# Patient Record
Sex: MALE | Race: WHITE | NOT HISPANIC OR LATINO | Employment: OTHER | URBAN - NONMETROPOLITAN AREA
[De-identification: names, ages, dates, MRNs, and addresses within clinical notes are randomized per-mention and may not be internally consistent; named-entity substitution may affect disease eponyms.]

---

## 2021-11-01 ENCOUNTER — APPOINTMENT (OUTPATIENT)
Dept: GENERAL RADIOLOGY | Facility: HOSPITAL | Age: 51
End: 2021-11-01

## 2021-11-01 ENCOUNTER — HOSPITAL ENCOUNTER (EMERGENCY)
Facility: HOSPITAL | Age: 51
Discharge: HOME | End: 2021-11-01
Attending: EMERGENCY MEDICINE | Admitting: EMERGENCY MEDICINE

## 2021-11-01 VITALS
DIASTOLIC BLOOD PRESSURE: 85 MMHG | WEIGHT: 290 LBS | RESPIRATION RATE: 16 BRPM | TEMPERATURE: 98.6 F | HEIGHT: 69 IN | BODY MASS INDEX: 42.95 KG/M2 | HEART RATE: 80 BPM | OXYGEN SATURATION: 98 % | SYSTOLIC BLOOD PRESSURE: 129 MMHG

## 2021-11-01 DIAGNOSIS — R07.9 CHEST PAIN, UNSPECIFIED TYPE: Primary | ICD-10-CM

## 2021-11-01 LAB
ALBUMIN SERPL-MCNC: 4.4 G/DL (ref 3.5–5.2)
ALBUMIN/GLOB SERPL: 1.9 G/DL
ALP SERPL-CCNC: 74 U/L (ref 39–117)
ALT SERPL W P-5'-P-CCNC: 28 U/L (ref 1–41)
ANION GAP SERPL CALCULATED.3IONS-SCNC: 10 MMOL/L (ref 5–15)
AST SERPL-CCNC: 20 U/L (ref 1–40)
BASOPHILS # BLD AUTO: 0.08 10*3/MM3 (ref 0–0.2)
BASOPHILS NFR BLD AUTO: 0.9 % (ref 0–1.5)
BILIRUB SERPL-MCNC: 0.6 MG/DL (ref 0–1.2)
BUN SERPL-MCNC: 11 MG/DL (ref 6–20)
BUN/CREAT SERPL: 12.4 (ref 7–25)
CALCIUM SPEC-SCNC: 9.2 MG/DL (ref 8.6–10.5)
CHLORIDE SERPL-SCNC: 103 MMOL/L (ref 98–107)
CO2 SERPL-SCNC: 26 MMOL/L (ref 22–29)
CREAT SERPL-MCNC: 0.89 MG/DL (ref 0.76–1.27)
DEPRECATED RDW RBC AUTO: 38.2 FL (ref 37–54)
EOSINOPHIL # BLD AUTO: 0.32 10*3/MM3 (ref 0–0.4)
EOSINOPHIL NFR BLD AUTO: 3.5 % (ref 0.3–6.2)
ERYTHROCYTE [DISTWIDTH] IN BLOOD BY AUTOMATED COUNT: 12.2 % (ref 12.3–15.4)
GFR SERPL CREATININE-BSD FRML MDRD: 91 ML/MIN/1.73
GLOBULIN UR ELPH-MCNC: 2.3 GM/DL
GLUCOSE SERPL-MCNC: 93 MG/DL (ref 65–99)
HCT VFR BLD AUTO: 45.1 % (ref 37.5–51)
HGB BLD-MCNC: 16 G/DL (ref 13–17.7)
HOLD SPECIMEN: NORMAL
HOLD SPECIMEN: NORMAL
IMM GRANULOCYTES # BLD AUTO: 0.07 10*3/MM3 (ref 0–0.05)
IMM GRANULOCYTES NFR BLD AUTO: 0.8 % (ref 0–0.5)
INR PPP: 0.98 (ref 0.8–1.2)
LYMPHOCYTES # BLD AUTO: 2.75 10*3/MM3 (ref 0.7–3.1)
LYMPHOCYTES NFR BLD AUTO: 30.1 % (ref 19.6–45.3)
MCH RBC QN AUTO: 30.7 PG (ref 26.6–33)
MCHC RBC AUTO-ENTMCNC: 35.5 G/DL (ref 31.5–35.7)
MCV RBC AUTO: 86.4 FL (ref 79–97)
MONOCYTES # BLD AUTO: 0.78 10*3/MM3 (ref 0.1–0.9)
MONOCYTES NFR BLD AUTO: 8.5 % (ref 5–12)
NEUTROPHILS NFR BLD AUTO: 5.15 10*3/MM3 (ref 1.7–7)
NEUTROPHILS NFR BLD AUTO: 56.2 % (ref 42.7–76)
NRBC BLD AUTO-RTO: 0 /100 WBC (ref 0–0.2)
NT-PROBNP SERPL-MCNC: 10.8 PG/ML (ref 0–450)
PLATELET # BLD AUTO: 202 10*3/MM3 (ref 140–450)
PMV BLD AUTO: 9.7 FL (ref 6–12)
POTASSIUM SERPL-SCNC: 4.1 MMOL/L (ref 3.5–5.2)
PROT SERPL-MCNC: 6.7 G/DL (ref 6–8.5)
PROTHROMBIN TIME: 12.9 SECONDS (ref 11.1–15.3)
QT INTERVAL: 338 MS
QTC INTERVAL: 431 MS
RBC # BLD AUTO: 5.22 10*6/MM3 (ref 4.14–5.8)
SODIUM SERPL-SCNC: 139 MMOL/L (ref 136–145)
TROPONIN T SERPL-MCNC: <0.01 NG/ML (ref 0–0.03)
TROPONIN T SERPL-MCNC: <0.01 NG/ML (ref 0–0.03)
WBC # BLD AUTO: 9.15 10*3/MM3 (ref 3.4–10.8)
WHOLE BLOOD HOLD SPECIMEN: NORMAL
WHOLE BLOOD HOLD SPECIMEN: NORMAL

## 2021-11-01 PROCEDURE — 83880 ASSAY OF NATRIURETIC PEPTIDE: CPT | Performed by: EMERGENCY MEDICINE

## 2021-11-01 PROCEDURE — 93010 ELECTROCARDIOGRAM REPORT: CPT | Performed by: INTERNAL MEDICINE

## 2021-11-01 PROCEDURE — 85610 PROTHROMBIN TIME: CPT | Performed by: EMERGENCY MEDICINE

## 2021-11-01 PROCEDURE — 80053 COMPREHEN METABOLIC PANEL: CPT | Performed by: EMERGENCY MEDICINE

## 2021-11-01 PROCEDURE — 84484 ASSAY OF TROPONIN QUANT: CPT | Performed by: EMERGENCY MEDICINE

## 2021-11-01 PROCEDURE — 85025 COMPLETE CBC W/AUTO DIFF WBC: CPT | Performed by: EMERGENCY MEDICINE

## 2021-11-01 PROCEDURE — 99283 EMERGENCY DEPT VISIT LOW MDM: CPT

## 2021-11-01 PROCEDURE — 93005 ELECTROCARDIOGRAM TRACING: CPT | Performed by: EMERGENCY MEDICINE

## 2021-11-01 PROCEDURE — 71045 X-RAY EXAM CHEST 1 VIEW: CPT

## 2021-11-01 RX ORDER — SODIUM CHLORIDE 0.9 % (FLUSH) 0.9 %
10 SYRINGE (ML) INJECTION AS NEEDED
Status: DISCONTINUED | OUTPATIENT
Start: 2021-11-01 | End: 2021-11-02 | Stop reason: HOSPADM

## 2021-11-01 RX ORDER — METOPROLOL SUCCINATE 50 MG/1
50 TABLET, EXTENDED RELEASE ORAL DAILY
COMMUNITY
End: 2021-11-08 | Stop reason: SDUPTHER

## 2021-11-02 NOTE — ED PROVIDER NOTES
Subjective   50-year-old male with history of thyroid issue who takes metoprolol for heart rate control presents the emergency department with concern for chest pain.  He reports he has had some intermittent issues with chest pain over the last few months that seem to be worse with bending over.  He reports that it is right-sided and mostly sharp pain rarely radiates to the left.  Tonight it was sharp and at times when the sharp pain is gone does feel like some pressure.  Denies associated shortness of breath, nausea or diaphoresis.  No lower extremity swelling.  Reports tonight he was helping his father drag a deer when the pain started.  It did not seem to get better with rest as it usually does.  He has never had a stress test or heart catheterization.  He does have a brother who had bypass surgery this year at the age of 49.  Father also has coronary artery disease.  He is a former smoker.    Family history, surgical history, social history, current medications and allergies are reviewed with the patient and triage documentation and vitals are reviewed.      History provided by:  Patient   used: No        Review of Systems   Constitutional: Negative for chills and fever.   HENT: Negative for congestion and sore throat.    Eyes: Negative for photophobia and visual disturbance.   Respiratory: Negative for shortness of breath and wheezing.    Cardiovascular: Positive for chest pain. Negative for palpitations and leg swelling.   Gastrointestinal: Negative for abdominal pain, diarrhea, nausea and vomiting.   Endocrine: Negative for polydipsia, polyphagia and polyuria.   Genitourinary: Negative for dysuria, frequency, hematuria and urgency.   Musculoskeletal: Negative for arthralgias, back pain, myalgias and neck pain.   Skin: Negative for rash and wound.   Allergic/Immunologic: Negative.    Neurological: Negative.    Hematological: Negative.    Psychiatric/Behavioral: Negative.        History  reviewed. No pertinent past medical history.    Allergies   Allergen Reactions   • Hydrocodone-Acetaminophen Anxiety     Reaction: anxiety       Past Surgical History:   Procedure Laterality Date   • AMPUTATION FINGER / THUMB     • APPENDECTOMY         History reviewed. No pertinent family history.    Social History     Socioeconomic History   • Marital status:    Tobacco Use   • Smoking status: Former Smoker   • Smokeless tobacco: Never Used   • Tobacco comment: ages [age]   Substance and Sexual Activity   • Alcohol use: Never   • Drug use: Never   • Sexual activity: Defer           Objective   Physical Exam  Vitals and nursing note reviewed.   Constitutional:       General: He is not in acute distress.     Appearance: He is well-developed. He is obese. He is not ill-appearing, toxic-appearing or diaphoretic.   HENT:      Head: Normocephalic.   Eyes:      Pupils: Pupils are equal, round, and reactive to light.   Neck:      Vascular: No JVD.   Cardiovascular:      Rate and Rhythm: Normal rate and regular rhythm.  No extrasystoles are present.     Pulses:           Radial pulses are 2+ on the right side and 2+ on the left side.        Dorsalis pedis pulses are 2+ on the right side and 2+ on the left side.      Heart sounds: No murmur heard.      Pulmonary:      Effort: Pulmonary effort is normal. No tachypnea, accessory muscle usage or respiratory distress.      Breath sounds: No decreased breath sounds, wheezing, rhonchi or rales.   Chest:      Chest wall: No tenderness.   Abdominal:      General: Bowel sounds are normal.      Palpations: Abdomen is soft. There is no hepatomegaly or splenomegaly.   Musculoskeletal:      Cervical back: Normal range of motion and neck supple.      Right lower leg: No edema.      Left lower leg: No edema.   Skin:     General: Skin is warm and dry.      Capillary Refill: Capillary refill takes less than 2 seconds.   Neurological:      General: No focal deficit present.       Mental Status: He is alert and oriented to person, place, and time.   Psychiatric:         Mood and Affect: Mood normal.         Behavior: Behavior normal.         Procedures  none         ED Course      Labs Reviewed   CBC WITH AUTO DIFFERENTIAL - Abnormal; Notable for the following components:       Result Value    RDW 12.2 (*)     Immature Grans % 0.8 (*)     Immature Grans, Absolute 0.07 (*)     All other components within normal limits   TROPONIN (IN-HOUSE) - Normal    Narrative:     Troponin T Reference Range:  <= 0.03 ng/mL-   Negative for AMI  >0.03 ng/mL-     Abnormal for myocardial necrosis.  Clinicians would have to utilize clinical acumen, EKG, Troponin and serial changes to determine if it is an Acute Myocardial Infarction or myocardial injury due to an underlying chronic condition.       Results may be falsely decreased if patient taking Biotin.     TROPONIN (IN-HOUSE) - Normal    Narrative:     Troponin T Reference Range:  <= 0.03 ng/mL-   Negative for AMI  >0.03 ng/mL-     Abnormal for myocardial necrosis.  Clinicians would have to utilize clinical acumen, EKG, Troponin and serial changes to determine if it is an Acute Myocardial Infarction or myocardial injury due to an underlying chronic condition.       Results may be falsely decreased if patient taking Biotin.     BNP (IN-HOUSE) - Normal    Narrative:     Among patients with dyspnea, NT-proBNP is highly sensitive for the detection of acute congestive heart failure. In addition NT-proBNP of <300 pg/ml effectively rules out acute congestive heart failure with 99% negative predictive value.    Results may be falsely decreased if patient taking Biotin.     PROTIME-INR - Normal    Narrative:     Therapeutic range for most indications is 2.0-3.0 INR,  or 2.5-3.5 for mechanical heart valves.   RAINBOW DRAW    Narrative:     The following orders were created for panel order [place] Draw.  Procedure                               Abnormality         Status                      ---------                               -----------         ------                     Green Top (Gel)[[id number]]                                  Final result               Lavender Top[[id number]]                                     Final result               Gold Top - SST[[id number]]                                   Final result               Light Blue Top[[id number]]                                   Final result                 Please view results for these tests on the individual orders.   COMPREHENSIVE METABOLIC PANEL    Narrative:     GFR Normal >60  Chronic Kidney Disease <60  Kidney Failure <15     CBC AND DIFFERENTIAL    Narrative:     The following orders were created for panel order CBC & Differential.  Procedure                               Abnormality         Status                     ---------                               -----------         ------                     CBC Auto Differential[[id number]]        Abnormal            Final result                 Please view results for these tests on the individual orders.   GREEN TOP   LAVENDER TOP   GOLD TOP - SST   LIGHT BLUE TOP     XR Chest 1 View    Result Date: 11/1/2021  Narrative: EXAM:   XR Chest, 1 View CLINICAL HISTORY:   The patient is 50 years old and is Male; Chest Pain triage protocol Chest Pain Triage Protocol TECHNIQUE:   Frontal view of the chest. COMPARISON:   3/1/2014 FINDINGS:   LUNGS:  Bilateral atelectasis.  No consolidation.   PLEURAL SPACE:  No pneumothorax or pleural effusion.   HEART:  Normal   MEDIASTINUM:  Normal   BONES/JOINTS:  Normal     Impression: Bilateral atelectasis. Electronically signed by:  Efrain uGan MD  11/1/2021 9:23 PM CDT Workstation: [id number]      EKG November 1, 2021 at 2012 reveals normal sinus rhythm at a rate of 98 bpm.  Incomplete right bundle branch block and left anterior fascicular block.  QTc 431.  T wave flattening in aVL and lead III without inversion.  No ST elevation or  depression.  No evidence of acute ischemia.      HEART Score (for prediction of 6-week risk of major adverse cardiac event) reviewed and/or performed as part of the patient evaluation and treatment planning process.  The result associated with this review/performance is: 3     HEART Score for Major Cardiac Events from [email or web address]  on 11/1/2021  ** All calculations should be rechecked by clinician prior to use **    RESULT SUMMARY:  3 points  Low Score (0-3 points)    Risk of MACE of 0.9-1.7%.      INPUTS:  History --> 1 = Moderately suspicious  EKG --> 0 = Normal  Age --> 1 = 45-64  Risk factors --> 1 = 1-2 risk factors  Initial troponin --> 0 = ?normal limit      MDM  Number of Diagnoses or Management Options     Amount and/or Complexity of Data Reviewed  Clinical lab tests: reviewed  Tests in the radiology section of CPT®: reviewed    Patient Progress  Patient progress: stable    Heart score of 3.  Low risk factors but slightly concerning story.  Troponin negative x2.  Vital signs remained stable.  Patient is advised on need for close outpatient stress testing and is given information on contacting cardiology for this.  Not felt necessary to admit for further inpatient evaluation at this time.  He is advised on reasons to return to the emergency department for further evaluation and agreeable to plan and discharge.    Final diagnoses:   Chest pain, unspecified type       ED Disposition  ED Disposition     ED Disposition Condition Comment    Discharge Stable           Neelima Garner, [address]  [facility] [address] [phone]    Schedule an appointment as soon as possible for a visit   for outpatient stress test    Alcon Tubbs MD  [address] [place] [id number] [phone]               Medication List      No changes were made to your prescriptions during this visit.          Juan José Toledo, DO  11/02/21 0000

## 2021-11-02 NOTE — DISCHARGE INSTRUCTIONS
Please return for new or worsening symptoms.  Follow-up with cardiology provided for outpatient stress testing.

## 2021-11-08 ENCOUNTER — OFFICE VISIT (OUTPATIENT)
Dept: CARDIOLOGY | Facility: CLINIC | Age: 51
End: 2021-11-08

## 2021-11-08 VITALS
HEART RATE: 94 BPM | HEIGHT: 69 IN | OXYGEN SATURATION: 98 % | WEIGHT: 307.4 LBS | BODY MASS INDEX: 45.53 KG/M2 | SYSTOLIC BLOOD PRESSURE: 128 MMHG | DIASTOLIC BLOOD PRESSURE: 86 MMHG

## 2021-11-08 DIAGNOSIS — R07.2 PRECORDIAL CHEST PAIN: Primary | ICD-10-CM

## 2021-11-08 PROCEDURE — 99204 OFFICE O/P NEW MOD 45 MIN: CPT | Performed by: NURSE PRACTITIONER

## 2021-11-08 RX ORDER — ATORVASTATIN CALCIUM 10 MG/1
10 TABLET, FILM COATED ORAL DAILY
Qty: 30 TABLET | Refills: 11 | Status: SHIPPED | OUTPATIENT
Start: 2021-11-08 | End: 2022-12-09 | Stop reason: DRUGHIGH

## 2021-11-08 RX ORDER — METOPROLOL SUCCINATE 50 MG/1
50 TABLET, EXTENDED RELEASE ORAL DAILY
Qty: 30 TABLET | Refills: 3 | Status: SHIPPED | OUTPATIENT
Start: 2021-11-08 | End: 2021-12-07

## 2021-11-08 RX ORDER — ASPIRIN 81 MG/1
81 TABLET ORAL DAILY
Qty: 30 TABLET | Refills: 6 | Status: SHIPPED | OUTPATIENT
Start: 2021-11-08 | End: 2023-03-15 | Stop reason: SDUPTHER

## 2021-11-08 NOTE — PROGRESS NOTES
[facility] [place]    Chest Pain (chief complaint)      History of Present Illness    Mr. Bishop is a 50 year old patient who presents to [facility] as an ER follow up for chest pain.   He has chest pain chronically for 3 months or more. He has this pain almost daily. IT is reproducible on his right side when he lies down, bends over, or picks something up.   This midsternal pain is sharp and intermittent. This pain is usually moderate and controlled.   His brother had open heart surgery last year at 49 and is not a DM or smoker. His dad was in his 60s with MI and CABG in [place].      He has gained weight. He is not on medication. The BB was ordered a while ago because his HR was high due to hyperthyroidism.     Today I added Lipitor, ASA, and Toprol XL for his angina. CCTA was ordered. Echo from 2014 reviewed, without structural changes.     Cardiac Risk Factors:  The ASCVD Risk score (Dora GEM Jr., et al., 2013) failed to calculate for the following reasons:    Cannot find a previous HDL lab    Cannot find a previous total cholesterol lab      Past Medical History:   Diagnosis Date   • Thyroid trouble      Past Surgical History:   Procedure Laterality Date   • AMPUTATION     • APPENDECTOMY       Social History     Socioeconomic History   • Marital status:    Tobacco Use   • Smoking status: Former Smoker     Types: Cigarettes     Quit date: 3/1/2007     Years since quittin.7   • Smokeless tobacco: Never Used   Substance and Sexual Activity   • Alcohol use: Never   • Drug use: Never   • Sexual activity: Defer     Family History   Problem Relation Age of Onset   • Kidney disease Mother    • Heart disease Father    • Heart attack Father    • Heart disease Brother        ALLERGIES:  Allergies   Allergen Reactions   • Hydrocodone-Acetaminophen Anxiety     Reaction: anxiety       Advance Care  Planning   ACP discussion was declined by the patient. Patient does not have an advance directive, declines further assistance.       Review of Systems   Constitutional: Negative for chills, decreased appetite and fever.   HENT: Negative.    Eyes: Negative.    Cardiovascular: Negative for chest pain, claudication, dyspnea on exertion, irregular heartbeat, leg swelling and palpitations.   Respiratory: Negative for cough, shortness of breath and wheezing.    Endocrine: Negative.    Skin: Negative for dry skin, flushing and rash.   Musculoskeletal: Negative for falls and myalgias.   Gastrointestinal: Negative for abdominal pain, change in bowel habit and melena.   Genitourinary: Negative for frequency and hematuria.   Neurological: Negative for dizziness, light-headedness, loss of balance and weakness.   Psychiatric/Behavioral: Negative for altered mental status and memory loss. The patient is not nervous/anxious.        Current Outpatient Medications   Medication Sig Dispense Refill   • metoprolol succinate XL (TOPROL-XL) 50 MG 24 hr tablet Take 1 tablet by mouth Daily. 30 tablet 3   • aspirin (aspirin) 81 MG EC tablet Take 1 tablet by mouth Daily. 30 tablet 6   • atorvastatin (LIPITOR) 10 MG tablet Take 1 tablet by mouth Daily. 30 tablet 11     No current facility-administered medications for this visit.       OBJECTIVE:    Physical Exam:   Constitutional:       General: Not in acute distress.     Appearance: Well-developed.   HENT:      Head: Normocephalic and atraumatic.   Neck:      Vascular: No JVD.   Pulmonary:      Effort: Pulmonary effort is normal. No respiratory distress.      Breath sounds: Normal breath sounds. No wheezing. No rales.   Cardiovascular:      Normal rate. Regular rhythm.   Pulses:     Intact distal pulses.   Abdominal:      General: Bowel sounds are normal.      Palpations: Abdomen is soft.   Musculoskeletal: Normal range of motion.      Cervical back: Normal range of motion. Skin:      "General: Skin is warm and dry.      Findings: No erythema.   Neurological:      Mental Status: Alert and oriented to person, place, and time.   Psychiatric:         Behavior: Behavior normal.         Thought Content: Thought content normal.         Judgment: Judgment normal.       Vitals:    21 1015   BP: 128/86   BP Location: Left arm   Patient Position: Sitting   Cuff Size: Adult   Pulse: 94   SpO2: 98%   Weight: (!) 139 kg (307 lb 6.4 oz)   Height: 175.3 cm (69\")       DATA REVIEWED by me:   Results for orders placed in visit on 14    Echo - Converted    Narrative  [facility]      PT NAME:  [facility]  MR NUMBER:  [id number]  :  1970  ECHOCARDIOGRAM      MEDICAL OFFICE BUILDING  DATE:  2014    PRIMARY CARE PHYSICIAN:    ORDERING DOCTOR:  IFEOMA DEJESUS MD    REASON FOR ECHO:  Dyspnea  INTERPRETING  CARDIOLOGIST:  KAIA GARCIA M.D.    PRIOR ECHO:  No            DATE OF PRIOR ECHO:    PATIENT PROCEDURE #:       LOCATION OF STUDY:  [address]      QUANTITATIVE MEASUREMENTS    LA =    38     (<40)    LA/AO Ratio  1.2  Ao =    31     (<37)    AoV Open     24   (>15)  IVS=    10     (<11)    LVPW         10   (<11)  LVEDd=  52     (38-56)  LVEDs        35   (22-40)  RV=            (<26)    LVOT         2.4  EF=     55-60  %        %F.S.             (24-46)    Max. Pulmonary Gradient  6    mmHg  RV Systolic Pressure          mmHg    MV Area                  5.5  cm2  Mean MV Gradient         1    mmHg  MV p 1/2 t               40   msec    EVELYN (Doppler)            3.2  cm2  EVELYN (Planimeter)              cm2  Max. AV Gradient         5    mmHg  Mean AV Gradient         3    mmHg  AI p 1/2 t                    msec    DESCRIPTION:  Quality of study is fair.    FINDINGS  CHAMBER DIMENSIONS AND FUNCTION  1. Left ventricular systolic function is normal with ejection fraction  of 55% to 60%.  Left ventricular wall thickness " is normal.  2. Left atrium is normal in size.  3. Right ventricular contractility and chamber dimensions are normal.  4. Right atrium is normal in size.      VALVES  1. Mitral valve leaflets are normal with intact valve excursion.  2. Aortic valve is trileaflet in structure with normal valve motion.  3. Pulmonic valve was not well visualized.    Color flow Doppler imaging reveals no evidence of valve regurgitation.  The spectral Doppler velocity measurements of the mitral valve inflow  reveals normal E:A wave amplitude ratio.    Pericardium is normal with no evidence of pericardial effusion.    IVC was not well visualized.    A:  1. Normal left ventricular systolic function.  Ejection fraction of 55%  to 60%.  2. No evidence of pericardial effusion.      KAIA GARCIA M.D.  Electronically Signed  03/27/2014 10:16:54  By KAIA GARCIA M.D.    SANAM/joseph  Dictated:   03/22/2014 [id number]  Transcribed:   03/24/2014 1229  Page #page      XR Chest 1 View    Result Date: 11/1/2021  Bilateral atelectasis. Electronically signed by:  Efrain Guan MD  11/1/2021 9:23 PM CDT Workstation: [id number]         Labs Reviewed by me: BMP, CBC, LIPID, TSH  Lab Results   Component Value Date    GLUCOSE 93 11/01/2021    CALCIUM 9.2 11/01/2021     11/01/2021    K 4.1 11/01/2021    CO2 26.0 11/01/2021     11/01/2021    BUN 11 11/01/2021    CREATININE 0.89 11/01/2021    EGFRIFNONA 91 11/01/2021    BCR 12.4 11/01/2021    ANIONGAP 10.0 11/01/2021     Lab Results   Component Value Date    WBC 9.15 11/01/2021    HGB 16.0 11/01/2021    HCT 45.1 11/01/2021    MCV 86.4 11/01/2021     11/01/2021     No results found for: CHOL  No results found for: TRIG  No results found for: HDL  No components found for: LDLCALC  No results found for: LDL  No results found for: HDLLDLRATIO  No components found for: CHOLHDL  Lab Results   Component Value Date    TSH 0.321 (L) 08/13/2019     Lab Results   Component Value Date    PROBNP 10.8 11/01/2021        EKG:     Echo 2014                           QUANTITATIVE MEASUREMENTS     LA =    38     (<40)    LA/AO Ratio  1.2  Ao =    31     (<37)    AoV Open     24   (>15)  IVS=    10     (<11)    LVPW         10   (<11)  LVEDd=  52     (38-56)  LVEDs        35   (22-40)  RV=            (<26)    LVOT         2.4  EF=     55-60  %        %F.S.             (24-46)     Max. Pulmonary Gradient  6    mmHg  RV Systolic Pressure          mmHg     MV Area                  5.5  cm2  Mean MV Gradient         1    mmHg  MV p 1/2 t               40   msec     EVELYN (Doppler)            3.2  cm2  EVELYN (Planimeter)              cm2  Max. AV Gradient         5    mmHg  Mean AV Gradient         3    mmHg  AI p 1/2 t                    msec     DESCRIPTION:  Quality of study is fair.     FINDINGS  CHAMBER DIMENSIONS AND FUNCTION  1. Left ventricular systolic function is normal with ejection fraction        of 55% to 60%.  Left ventricular wall thickness is normal.  2. Left atrium is normal in size.  3. Right ventricular contractility and chamber dimensions are normal.  4. Right atrium is normal in size.        VALVES  1. Mitral valve leaflets are normal with intact valve excursion.  2. Aortic valve is trileaflet in structure with normal valve motion.  3. Pulmonic valve was not well visualized.     Color flow Doppler imaging reveals no evidence of valve regurgitation.  The spectral Doppler velocity measurements of the mitral valve inflow  reveals normal E:A wave amplitude ratio.     Pericardium is normal with no evidence of pericardial effusion.     IVC was not well visualized.     A:  1. Normal left ventricular systolic function.  Ejection fraction of 55%        to 60%.  2. No evidence of pericardial effusion.        KAIA GARCIA M.D.      The following portions of the patient's history were reviewed and updated as appropriate: allergies, current medications, past family history, past medical history, past social history, past surgical  history and problem list.  Old records that were reviewed and pertinent information is included in the above objective data.     ASSESSMENT/PLAN:       Diagnosis Plan   1. Precordial chest pain  Chest pain syndrome. Pain characteristic: atypical angina   Patient is Moderate Risk.     Ischemic evaluation:ordered.    The patient is notable to exercise.   EKG is Abnormal:  Troponin is normal x 2.  Risks/Benefits discussed  Ischemia evaluation with Coronary CTA  TTE, Basic Labs, PA/LA CXR (results reviewed if completed)    CT Angiogram Coronary    Due to his strong CAD family history that is atypical for DM/smoking, I recommend a CTA coronary for anatomy.   We discussed the possible outcomes including low risk, 0, moderate and high risk >70%.   He is agreeable to a [facility] should the need arise.   He is agreeable to started chest pain medications including taking his Toprol as prescribed, including tonight and tomorrow for his CTA coronary.          Patient's Body mass index is 45.4 kg/m². indicating that he is obese (BMI >30). Obesity-related health conditions include the following: none. Obesity is unchanged. BMI is is above average; BMI management plan is completed. We discussed portion control and increasing exercise..    Follow up in 2 weeks to 1 month depending on outcome.           This document has been electronically signed by CHENG Mcgovern on November 8, 2021 13:31 CST

## 2021-11-08 NOTE — PATIENT INSTRUCTIONS
Try pepcid 20mg once or twice daily for about a week or 2 to see if it helps chest pains.     Will preform a CT scan of your heart arteries to evaluate for disease.     >70% blockage with prompt a heart cath.     Moderate disease will be medical management with ASA and cholesterol medication + your Metoprolol.

## 2021-11-09 ENCOUNTER — HOSPITAL ENCOUNTER (OUTPATIENT)
Dept: CT IMAGING | Facility: HOSPITAL | Age: 51
Discharge: HOME | End: 2021-11-09
Admitting: NURSE PRACTITIONER

## 2021-11-09 ENCOUNTER — PREP FOR SURGERY (OUTPATIENT)
Dept: OTHER | Facility: HOSPITAL | Age: 51
End: 2021-11-09

## 2021-11-09 DIAGNOSIS — R07.2 PRECORDIAL CHEST PAIN: Primary | ICD-10-CM

## 2021-11-09 DIAGNOSIS — R93.1 ABNORMAL FINDINGS DIAGNOSTIC IMAGING OF HEART AND CORONARY CIRCULATION: ICD-10-CM

## 2021-11-09 PROCEDURE — 75574 CT ANGIO HRT W/3D IMAGE: CPT

## 2021-11-09 PROCEDURE — 0 IOPAMIDOL PER 1 ML: Performed by: NURSE PRACTITIONER

## 2021-11-09 RX ORDER — ASPIRIN 325 MG
325 TABLET ORAL ONCE
Status: CANCELLED | OUTPATIENT
Start: 2021-11-09 | End: 2021-11-09

## 2021-11-09 RX ORDER — SODIUM CHLORIDE 0.9 % (FLUSH) 0.9 %
10 SYRINGE (ML) INJECTION AS NEEDED
Status: CANCELLED | OUTPATIENT
Start: 2021-11-16

## 2021-11-09 RX ORDER — ISOSORBIDE MONONITRATE 30 MG/1
30 TABLET, EXTENDED RELEASE ORAL EVERY MORNING
Qty: 30 TABLET | Refills: 3 | Status: SHIPPED | OUTPATIENT
Start: 2021-11-09 | End: 2021-11-17 | Stop reason: HOSPADM

## 2021-11-09 RX ORDER — SODIUM CHLORIDE 0.9 % (FLUSH) 0.9 %
3 SYRINGE (ML) INJECTION EVERY 12 HOURS SCHEDULED
Status: CANCELLED | OUTPATIENT
Start: 2021-11-16

## 2021-11-09 RX ORDER — SODIUM CHLORIDE 9 MG/ML
50 INJECTION, SOLUTION INTRAVENOUS CONTINUOUS
Status: CANCELLED | OUTPATIENT
Start: 2021-11-16

## 2021-11-09 RX ORDER — ASPIRIN 325 MG
325 TABLET, DELAYED RELEASE (ENTERIC COATED) ORAL DAILY
Status: CANCELLED | OUTPATIENT
Start: 2021-11-10

## 2021-11-09 RX ADMIN — IOPAMIDOL 90 ML: 755 INJECTION, SOLUTION INTRAVENOUS at 08:28

## 2021-11-11 ENCOUNTER — TELEPHONE (OUTPATIENT)
Dept: CARDIOLOGY | Facility: CLINIC | Age: 51
End: 2021-11-11

## 2021-11-15 ENCOUNTER — LAB (OUTPATIENT)
Dept: LAB | Facility: HOSPITAL | Age: 51
End: 2021-11-15

## 2021-11-15 DIAGNOSIS — R93.1 ABNORMAL FINDINGS DIAGNOSTIC IMAGING OF HEART AND CORONARY CIRCULATION: ICD-10-CM

## 2021-11-15 DIAGNOSIS — R07.2 PRECORDIAL CHEST PAIN: ICD-10-CM

## 2021-11-15 LAB — SARS-COV-2 N GENE RESP QL NAA+PROBE: NOT DETECTED

## 2021-11-15 PROCEDURE — C9803 HOPD COVID-19 SPEC COLLECT: HCPCS

## 2021-11-15 PROCEDURE — 87635 SARS-COV-2 COVID-19 AMP PRB: CPT

## 2021-11-16 ENCOUNTER — HOSPITAL ENCOUNTER (OUTPATIENT)
Facility: HOSPITAL | Age: 51
Discharge: HOME | End: 2021-11-17
Attending: INTERNAL MEDICINE | Admitting: INTERNAL MEDICINE

## 2021-11-16 ENCOUNTER — APPOINTMENT (OUTPATIENT)
Dept: CARDIOLOGY | Facility: HOSPITAL | Age: 51
End: 2021-11-16

## 2021-11-16 DIAGNOSIS — R07.2 PRECORDIAL CHEST PAIN: ICD-10-CM

## 2021-11-16 DIAGNOSIS — R93.1 ABNORMAL FINDINGS DIAGNOSTIC IMAGING OF HEART AND CORONARY CIRCULATION: ICD-10-CM

## 2021-11-16 LAB
ANION GAP SERPL CALCULATED.3IONS-SCNC: 7 MMOL/L (ref 5–15)
BH CV ECHO MEAS - ACS: 2.3 CM
BH CV ECHO MEAS - AO MAX PG (FULL): 0.59 MMHG
BH CV ECHO MEAS - AO MAX PG: 6.4 MMHG
BH CV ECHO MEAS - AO MEAN PG (FULL): -1 MMHG
BH CV ECHO MEAS - AO MEAN PG: 3 MMHG
BH CV ECHO MEAS - AO ROOT AREA (BSA CORRECTED): 1.4
BH CV ECHO MEAS - AO ROOT AREA: 9.6 CM^2
BH CV ECHO MEAS - AO ROOT DIAM: 3.5 CM
BH CV ECHO MEAS - AO V2 MAX: 126 CM/SEC
BH CV ECHO MEAS - AO V2 MEAN: 81.1 CM/SEC
BH CV ECHO MEAS - AO V2 VTI: 23.6 CM
BH CV ECHO MEAS - ASC AORTA: 3.3 CM
BH CV ECHO MEAS - AVA(I,A): 3.8 CM^2
BH CV ECHO MEAS - AVA(I,D): 3.8 CM^2
BH CV ECHO MEAS - AVA(V,A): 3.6 CM^2
BH CV ECHO MEAS - AVA(V,D): 3.6 CM^2
BH CV ECHO MEAS - BSA(HAYCOCK): 2.6 M^2
BH CV ECHO MEAS - BSA: 2.5 M^2
BH CV ECHO MEAS - BZI_BMI: 44.5 KILOGRAMS/M^2
BH CV ECHO MEAS - BZI_METRIC_HEIGHT: 175.3 CM
BH CV ECHO MEAS - BZI_METRIC_WEIGHT: 136.5 KG
BH CV ECHO MEAS - EDV(CUBED): 146.4 ML
BH CV ECHO MEAS - EDV(MOD-SP2): 102 ML
BH CV ECHO MEAS - EDV(MOD-SP4): 116 ML
BH CV ECHO MEAS - EDV(TEICH): 133.6 ML
BH CV ECHO MEAS - EF(CUBED): 70.7 %
BH CV ECHO MEAS - EF(MOD-SP2): 56.8 %
BH CV ECHO MEAS - EF(MOD-SP4): 56.7 %
BH CV ECHO MEAS - EF(TEICH): 61.9 %
BH CV ECHO MEAS - ESV(CUBED): 42.9 ML
BH CV ECHO MEAS - ESV(MOD-SP2): 44.1 ML
BH CV ECHO MEAS - ESV(MOD-SP4): 50.2 ML
BH CV ECHO MEAS - ESV(TEICH): 50.9 ML
BH CV ECHO MEAS - FS: 33.6 %
BH CV ECHO MEAS - IVS/LVPW: 1
BH CV ECHO MEAS - IVSD: 1.1 CM
BH CV ECHO MEAS - LA DIMENSION: 3.8 CM
BH CV ECHO MEAS - LA/AO: 1.1
BH CV ECHO MEAS - LV DIASTOLIC VOL/BSA (35-75): 47.2 ML/M^2
BH CV ECHO MEAS - LV MASS(C)D: 210.5 GRAMS
BH CV ECHO MEAS - LV MASS(C)DI: 85.7 GRAMS/M^2
BH CV ECHO MEAS - LV MAX PG: 5.8 MMHG
BH CV ECHO MEAS - LV MEAN PG: 4 MMHG
BH CV ECHO MEAS - LV SYSTOLIC VOL/BSA (12-30): 20.4 ML/M^2
BH CV ECHO MEAS - LV V1 MAX: 120 CM/SEC
BH CV ECHO MEAS - LV V1 MEAN: 89.8 CM/SEC
BH CV ECHO MEAS - LV V1 VTI: 23.4 CM
BH CV ECHO MEAS - LVIDD: 5.3 CM
BH CV ECHO MEAS - LVIDS: 3.5 CM
BH CV ECHO MEAS - LVLD AP2: 8.4 CM
BH CV ECHO MEAS - LVLD AP4: 9 CM
BH CV ECHO MEAS - LVLS AP2: 6.8 CM
BH CV ECHO MEAS - LVLS AP4: 7.6 CM
BH CV ECHO MEAS - LVOT AREA (M): 3.8 CM^2
BH CV ECHO MEAS - LVOT AREA: 3.8 CM^2
BH CV ECHO MEAS - LVOT DIAM: 2.2 CM
BH CV ECHO MEAS - LVPWD: 1 CM
BH CV ECHO MEAS - MV A MAX VEL: 39.4 CM/SEC
BH CV ECHO MEAS - MV DEC SLOPE: 471 CM/SEC^2
BH CV ECHO MEAS - MV E MAX VEL: 75.8 CM/SEC
BH CV ECHO MEAS - MV E/A: 1.9
BH CV ECHO MEAS - MV MAX PG: 3.2 MMHG
BH CV ECHO MEAS - MV MEAN PG: 1 MMHG
BH CV ECHO MEAS - MV P1/2T MAX VEL: 87.6 CM/SEC
BH CV ECHO MEAS - MV P1/2T: 54.5 MSEC
BH CV ECHO MEAS - MV V2 MAX: 88.9 CM/SEC
BH CV ECHO MEAS - MV V2 MEAN: 49.8 CM/SEC
BH CV ECHO MEAS - MV V2 VTI: 27.7 CM
BH CV ECHO MEAS - MVA P1/2T LCG: 2.5 CM^2
BH CV ECHO MEAS - MVA(P1/2T): 4 CM^2
BH CV ECHO MEAS - MVA(VTI): 3.2 CM^2
BH CV ECHO MEAS - PA MAX PG: 6 MMHG
BH CV ECHO MEAS - PA V2 MAX: 122 CM/SEC
BH CV ECHO MEAS - RAP SYSTOLE: 5 MMHG
BH CV ECHO MEAS - RVDD: 2.4 CM
BH CV ECHO MEAS - RVSP: 24.4 MMHG
BH CV ECHO MEAS - SI(AO): 92.4 ML/M^2
BH CV ECHO MEAS - SI(CUBED): 42.1 ML/M^2
BH CV ECHO MEAS - SI(LVOT): 36.2 ML/M^2
BH CV ECHO MEAS - SI(MOD-SP2): 23.6 ML/M^2
BH CV ECHO MEAS - SI(MOD-SP4): 26.8 ML/M^2
BH CV ECHO MEAS - SI(TEICH): 33.7 ML/M^2
BH CV ECHO MEAS - SV(AO): 227.1 ML
BH CV ECHO MEAS - SV(CUBED): 103.5 ML
BH CV ECHO MEAS - SV(LVOT): 89 ML
BH CV ECHO MEAS - SV(MOD-SP2): 57.9 ML
BH CV ECHO MEAS - SV(MOD-SP4): 65.8 ML
BH CV ECHO MEAS - SV(TEICH): 82.7 ML
BH CV ECHO MEAS - TR MAX VEL: 220 CM/SEC
BUN SERPL-MCNC: 18 MG/DL (ref 6–20)
BUN/CREAT SERPL: 22.2 (ref 7–25)
CALCIUM SPEC-SCNC: 8.9 MG/DL (ref 8.6–10.5)
CHLORIDE SERPL-SCNC: 106 MMOL/L (ref 98–107)
CHOLEST SERPL-MCNC: 125 MG/DL (ref 0–200)
CO2 SERPL-SCNC: 24 MMOL/L (ref 22–29)
CREAT SERPL-MCNC: 0.81 MG/DL (ref 0.76–1.27)
DEPRECATED RDW RBC AUTO: 38.1 FL (ref 37–54)
ERYTHROCYTE [DISTWIDTH] IN BLOOD BY AUTOMATED COUNT: 12.3 % (ref 12.3–15.4)
GFR SERPL CREATININE-BSD FRML MDRD: 101 ML/MIN/1.73
GLUCOSE SERPL-MCNC: 131 MG/DL (ref 65–99)
HBA1C MFR BLD: 5.7 % (ref 4.8–5.6)
HCT VFR BLD AUTO: 46.2 % (ref 37.5–51)
HDLC SERPL-MCNC: 38 MG/DL (ref 40–60)
HGB BLD-MCNC: 16.1 G/DL (ref 13–17.7)
INR PPP: 0.92 (ref 0.8–1.2)
LDLC SERPL CALC-MCNC: 52 MG/DL (ref 0–100)
LDLC/HDLC SERPL: 1.14 {RATIO}
MAXIMAL PREDICTED HEART RATE: 171 BPM
MCH RBC QN AUTO: 30 PG (ref 26.6–33)
MCHC RBC AUTO-ENTMCNC: 34.8 G/DL (ref 31.5–35.7)
MCV RBC AUTO: 86 FL (ref 79–97)
PLATELET # BLD AUTO: 182 10*3/MM3 (ref 140–450)
PMV BLD AUTO: 9.3 FL (ref 6–12)
POTASSIUM SERPL-SCNC: 4.3 MMOL/L (ref 3.5–5.2)
PROTHROMBIN TIME: 12.3 SECONDS (ref 11.1–15.3)
RBC # BLD AUTO: 5.37 10*6/MM3 (ref 4.14–5.8)
SODIUM SERPL-SCNC: 137 MMOL/L (ref 136–145)
STRESS TARGET HR: 145 BPM
TRIGL SERPL-MCNC: 219 MG/DL (ref 0–150)
TSH SERPL DL<=0.05 MIU/L-ACNC: 0.41 UIU/ML (ref 0.27–4.2)
VLDLC SERPL-MCNC: 35 MG/DL (ref 5–40)
WBC # BLD AUTO: 7.09 10*3/MM3 (ref 3.4–10.8)

## 2021-11-16 PROCEDURE — C1769 GUIDE WIRE: HCPCS | Performed by: INTERNAL MEDICINE

## 2021-11-16 PROCEDURE — 0 IOPAMIDOL PER 1 ML: Performed by: INTERNAL MEDICINE

## 2021-11-16 PROCEDURE — 25010000002 ONDANSETRON PER 1 MG: Performed by: INTERNAL MEDICINE

## 2021-11-16 PROCEDURE — 25010000002 HEPARIN (PORCINE) PER 1000 UNITS: Performed by: INTERNAL MEDICINE

## 2021-11-16 PROCEDURE — 93571 IV DOP VEL&/PRESS C FLO 1ST: CPT | Performed by: INTERNAL MEDICINE

## 2021-11-16 PROCEDURE — C1887 CATHETER, GUIDING: HCPCS | Performed by: INTERNAL MEDICINE

## 2021-11-16 PROCEDURE — 80048 BASIC METABOLIC PNL TOTAL CA: CPT | Performed by: NURSE PRACTITIONER

## 2021-11-16 PROCEDURE — C1894 INTRO/SHEATH, NON-LASER: HCPCS | Performed by: INTERNAL MEDICINE

## 2021-11-16 PROCEDURE — C9600 PERC DRUG-EL COR STENT SING: HCPCS | Performed by: INTERNAL MEDICINE

## 2021-11-16 PROCEDURE — C1725 CATH, TRANSLUMIN NON-LASER: HCPCS | Performed by: INTERNAL MEDICINE

## 2021-11-16 PROCEDURE — 92928 PRQ TCAT PLMT NTRAC ST 1 LES: CPT | Performed by: INTERNAL MEDICINE

## 2021-11-16 PROCEDURE — C1753 CATH, INTRAVAS ULTRASOUND: HCPCS | Performed by: INTERNAL MEDICINE

## 2021-11-16 PROCEDURE — 92978 ENDOLUMINL IVUS OCT C 1ST: CPT | Performed by: INTERNAL MEDICINE

## 2021-11-16 PROCEDURE — S0260 H&P FOR SURGERY: HCPCS | Performed by: INTERNAL MEDICINE

## 2021-11-16 PROCEDURE — 93306 TTE W/DOPPLER COMPLETE: CPT | Performed by: INTERNAL MEDICINE

## 2021-11-16 PROCEDURE — 25010000002 MIDAZOLAM PER 1 MG: Performed by: INTERNAL MEDICINE

## 2021-11-16 PROCEDURE — C1874 STENT, COATED/COV W/DEL SYS: HCPCS | Performed by: INTERNAL MEDICINE

## 2021-11-16 PROCEDURE — 94760 N-INVAS EAR/PLS OXIMETRY 1: CPT

## 2021-11-16 PROCEDURE — 93306 TTE W/DOPPLER COMPLETE: CPT

## 2021-11-16 PROCEDURE — 84443 ASSAY THYROID STIM HORMONE: CPT | Performed by: INTERNAL MEDICINE

## 2021-11-16 PROCEDURE — 80061 LIPID PANEL: CPT | Performed by: INTERNAL MEDICINE

## 2021-11-16 PROCEDURE — 25010000002 FENTANYL CITRATE (PF) 50 MCG/ML SOLUTION: Performed by: INTERNAL MEDICINE

## 2021-11-16 PROCEDURE — 85610 PROTHROMBIN TIME: CPT | Performed by: NURSE PRACTITIONER

## 2021-11-16 PROCEDURE — 94799 UNLISTED PULMONARY SVC/PX: CPT

## 2021-11-16 PROCEDURE — 93458 L HRT ARTERY/VENTRICLE ANGIO: CPT | Performed by: INTERNAL MEDICINE

## 2021-11-16 PROCEDURE — 85027 COMPLETE CBC AUTOMATED: CPT | Performed by: NURSE PRACTITIONER

## 2021-11-16 PROCEDURE — 83036 HEMOGLOBIN GLYCOSYLATED A1C: CPT | Performed by: INTERNAL MEDICINE

## 2021-11-16 DEVICE — XIENCE SKYPOINT™ EVEROLIMUS ELUTING CORONARY STENT SYSTEM 2.50 MM X 18 MM / RAPID-EXCHANGE
Type: IMPLANTABLE DEVICE | Status: FUNCTIONAL
Brand: XIENCE SKYPOINT™

## 2021-11-16 DEVICE — XIENCE SKYPOINT™ EVEROLIMUS ELUTING CORONARY STENT SYSTEM 3.50 MM X 33 MM / RAPID-EXCHANGE
Type: IMPLANTABLE DEVICE | Status: FUNCTIONAL
Brand: XIENCE SKYPOINT™

## 2021-11-16 RX ORDER — HEPARIN SODIUM 1000 [USP'U]/ML
INJECTION, SOLUTION INTRAVENOUS; SUBCUTANEOUS AS NEEDED
Status: DISCONTINUED | OUTPATIENT
Start: 2021-11-16 | End: 2021-11-16 | Stop reason: HOSPADM

## 2021-11-16 RX ORDER — SODIUM CHLORIDE 0.9 % (FLUSH) 0.9 %
3 SYRINGE (ML) INJECTION EVERY 12 HOURS SCHEDULED
Status: DISCONTINUED | OUTPATIENT
Start: 2021-11-16 | End: 2021-11-16

## 2021-11-16 RX ORDER — ASPIRIN 81 MG/1
81 TABLET, CHEWABLE ORAL DAILY
Status: DISCONTINUED | OUTPATIENT
Start: 2021-11-17 | End: 2021-11-17 | Stop reason: HOSPADM

## 2021-11-16 RX ORDER — NITROGLYCERIN 5 MG/ML
INJECTION, SOLUTION INTRAVENOUS AS NEEDED
Status: DISCONTINUED | OUTPATIENT
Start: 2021-11-16 | End: 2021-11-16 | Stop reason: HOSPADM

## 2021-11-16 RX ORDER — SODIUM CHLORIDE 9 MG/ML
150 INJECTION, SOLUTION INTRAVENOUS CONTINUOUS
Status: DISPENSED | OUTPATIENT
Start: 2021-11-16 | End: 2021-11-16

## 2021-11-16 RX ORDER — ACETAMINOPHEN 325 MG/1
650 TABLET ORAL EVERY 4 HOURS PRN
Status: DISCONTINUED | OUTPATIENT
Start: 2021-11-16 | End: 2021-11-17 | Stop reason: HOSPADM

## 2021-11-16 RX ORDER — METOPROLOL SUCCINATE 50 MG/1
50 TABLET, EXTENDED RELEASE ORAL DAILY
Status: DISCONTINUED | OUTPATIENT
Start: 2021-11-17 | End: 2021-11-17 | Stop reason: HOSPADM

## 2021-11-16 RX ORDER — FENTANYL CITRATE 50 UG/ML
INJECTION, SOLUTION INTRAMUSCULAR; INTRAVENOUS AS NEEDED
Status: DISCONTINUED | OUTPATIENT
Start: 2021-11-16 | End: 2021-11-16 | Stop reason: HOSPADM

## 2021-11-16 RX ORDER — MIDAZOLAM HYDROCHLORIDE 1 MG/ML
INJECTION INTRAMUSCULAR; INTRAVENOUS AS NEEDED
Status: DISCONTINUED | OUTPATIENT
Start: 2021-11-16 | End: 2021-11-16 | Stop reason: HOSPADM

## 2021-11-16 RX ORDER — SODIUM CHLORIDE 9 MG/ML
50 INJECTION, SOLUTION INTRAVENOUS CONTINUOUS
Status: DISCONTINUED | OUTPATIENT
Start: 2021-11-16 | End: 2021-11-16

## 2021-11-16 RX ORDER — SODIUM CHLORIDE 9 MG/ML
INJECTION, SOLUTION INTRAVENOUS CONTINUOUS PRN
Status: COMPLETED | OUTPATIENT
Start: 2021-11-16 | End: 2021-11-16

## 2021-11-16 RX ORDER — PRASUGREL 10 MG/1
10 TABLET, FILM COATED ORAL DAILY
Status: DISCONTINUED | OUTPATIENT
Start: 2021-11-17 | End: 2021-11-17 | Stop reason: HOSPADM

## 2021-11-16 RX ORDER — SODIUM CHLORIDE 0.9 % (FLUSH) 0.9 %
10 SYRINGE (ML) INJECTION AS NEEDED
Status: DISCONTINUED | OUTPATIENT
Start: 2021-11-16 | End: 2021-11-16

## 2021-11-16 RX ORDER — ASPIRIN 325 MG
325 TABLET, DELAYED RELEASE (ENTERIC COATED) ORAL DAILY
Status: DISCONTINUED | OUTPATIENT
Start: 2021-11-17 | End: 2021-11-16

## 2021-11-16 RX ORDER — ISOSORBIDE MONONITRATE 30 MG/1
30 TABLET, EXTENDED RELEASE ORAL EVERY MORNING
Status: DISCONTINUED | OUTPATIENT
Start: 2021-11-17 | End: 2021-11-17

## 2021-11-16 RX ORDER — PRASUGREL 10 MG/1
TABLET, FILM COATED ORAL AS NEEDED
Status: DISCONTINUED | OUTPATIENT
Start: 2021-11-16 | End: 2021-11-16 | Stop reason: HOSPADM

## 2021-11-16 RX ORDER — ONDANSETRON 2 MG/ML
INJECTION INTRAMUSCULAR; INTRAVENOUS AS NEEDED
Status: DISCONTINUED | OUTPATIENT
Start: 2021-11-16 | End: 2021-11-16 | Stop reason: HOSPADM

## 2021-11-16 RX ORDER — ATORVASTATIN CALCIUM 40 MG/1
40 TABLET, FILM COATED ORAL DAILY
Status: DISCONTINUED | OUTPATIENT
Start: 2021-11-16 | End: 2021-11-17 | Stop reason: HOSPADM

## 2021-11-16 RX ORDER — ASPIRIN 325 MG
325 TABLET ORAL ONCE
Status: COMPLETED | OUTPATIENT
Start: 2021-11-16 | End: 2021-11-16

## 2021-11-16 RX ADMIN — SODIUM CHLORIDE 150 ML/HR: 9 INJECTION, SOLUTION INTRAVENOUS at 12:18

## 2021-11-16 RX ADMIN — SODIUM CHLORIDE 50 ML/HR: 9 INJECTION, SOLUTION INTRAVENOUS at 06:28

## 2021-11-16 RX ADMIN — NICARDIPINE HYDROCHLORIDE 500 MCG: 25 INJECTION INTRAVENOUS at 12:19

## 2021-11-16 RX ADMIN — ATORVASTATIN CALCIUM 40 MG: 40 TABLET, FILM COATED ORAL at 20:14

## 2021-11-16 RX ADMIN — ASPIRIN 325 MG: 325 TABLET ORAL at 06:29

## 2021-11-16 RX ADMIN — SODIUM CHLORIDE 150 ML/HR: 9 INJECTION, SOLUTION INTRAVENOUS at 18:15

## 2021-11-16 RX ADMIN — Medication 10 ML: at 06:28

## 2021-11-16 NOTE — H&P
[facility] Cardiology  HISTORY AND PHYSICAL  Sanket Moi Bishop  51 y.o. male    Chief complaint -  Chest pain and abnormal CTA    History of Present Illness:  This is a 51-year-old gentleman with prior history of hypothyroidism, previous tobacco use and significant family history of premature coronary artery disease who was recently seen in the ER followed by office with the new onset chest pain over the last several months.  In the ER he was referred for acute coronary syndrome.  He was found to have an incomplete left right bundle branch block and left anterior fascicular block.  Subsequently a CTA was performed which showed a calcium score of 947 with severe calcification and potential disease in the LAD and RCA.  He was also found to have mildly reduced LV systolic function with an EF of 45% on the CTA.  Patient has been started on optimal medical therapy with aspirin/statin/beta-blocker and Imdur.  However he continued to have intermittent chest pain on exertion so was referred for cardiac cath for further evaluation.    He denies any prior history of coronary artery disease, heart failure.  Did report potential hypothyroidism but has not been started on any medications yet.      CALCIUM PLAQUE BURDEN:     REGION                                         CALCIUM SCORE  ([place])  Left Main                                                      0   Left Anterior Descending                           325   Circumflex                                                   88   Right Coronary Artery                                 534      Total calcium score is 947    Implication: Extensive atherosclerotic plaque  Risk of coronary artery disease: High likelihood of at least one  significant coronary narrowing     CT FUNCTIONAL ANALYSIS:  Ejection Fraction     45 %  Diastolic Volume     139 ml  Systolic Volume      76 ml  Stroke Volume        63 ml  Cardiac Output        4.0 L/minute     CTA OF THE  CORONARY ARTERIES:   There is a type B LAD.   Coronary anatomy is right dominant     Left Main: No calcified or soft itssue density plaque and no  stenosis.  LAD: Moderate atherosclerotic calcification the proximal aspect  results in approximately 50% luminal narrowing. Atherosclerotic  calcification noted in the mid aspect resulting in greater than  70% stenosis.  Circumflex: Small amount calcified plaque in the proximal aspect  resulting in less than 50% luminal narrowing.  RCA: Limited evaluation due to motion artifact in the mid to  distal aspect. Large calcified plaque in the proximal to mid  aspect resulting in 50-70% stenosis. Moderate calcified plaque in  the distal aspect resulting in 50-70% stenosis.  Focus of non calcified plaque with severe (>70%) stenosis vs  motion artifact in the mid to distal RCA   PDA: Moderate-sized calcified plaque in the proximal aspect  resulting in at least 50-70% stenosis.     ADDITIONAL FINDINGS:  The aortic valve is tricuspid.   There is no myocardial bridging.   On short axis views, the myocardium is homogeneous in thickness.  Two 4 mm lung nodules noted in the right upper lobe.  Fatty liver.  Slightly enlarged right hilar lymph node measuring 1.6 cm in  short axis.  Allergies   Allergen Reactions   • Hydrocodone-Acetaminophen Anxiety     Reaction: anxiety         Past Medical History:   Diagnosis Date   • Thyroid trouble          Past Surgical History:   Procedure Laterality Date   • AMPUTATION     • APPENDECTOMY           Family History   Problem Relation Age of Onset   • Kidney disease Mother    • Heart disease Father    • Heart attack Father    • Heart disease Brother          Social History     Socioeconomic History   • Marital status:    Tobacco Use   • Smoking status: Former Smoker     Types: Cigarettes     Quit date: 3/1/2007     Years since quittin.7   • Smokeless tobacco: Never Used   Substance and Sexual Activity   • Alcohol use: Never   • Drug use:  "Never   • Sexual activity: Defer         Prior to Admission medications    Medication Sig Start Date End Date Taking? Authorizing Provider   aspirin (aspirin) 81 MG EC tablet Take 1 tablet by mouth Daily. 11/8/21  Yes Neelima Garner APRN   atorvastatin (LIPITOR) 10 MG tablet Take 1 tablet by mouth Daily. 11/8/21  Yes Neelima Garner APRN   metoprolol succinate XL (TOPROL-XL) 50 MG 24 hr tablet Take 1 tablet by mouth Daily. 11/8/21  Yes Neelima Garner APRN   isosorbide mononitrate (IMDUR) 30 MG 24 hr tablet Take 1 tablet by mouth Every Morning. 11/9/21   Neelima Garner APRN         Review of Systems:     Constitution: Denies any fatigue, fever or chills.  HENT: Denies any headache, hearing impairment.  Eyes: Denies any blurring of vision, or photophobia.  Cardiovascular:  As per history of present illness.   Respiratory system: Denies any COPD, shortness of breath.  Endocrine:  No history of hyperlipidemia, diabetes.  Musculoskeletal:  No history of arthritis with musculoskeletal problems.  Gastrointestinal: No nausea, vomiting, or melena.  Genitourinary: No dysuria or hematuria.  Neurological: No history of seizure disorder, stroke, or memory problems.    Psychiatric/Behavioral: No history of depression, bipolar disorder or schizophrenia .    Hematological: No history of easy bruising.    ROS          OBJECTIVE:    /80   Pulse 76   Temp 97.2 °F (36.2 °C)   Resp 18   Ht 175.3 cm (69\")   Wt (!) 137 kg (301 lb 5.9 oz)   SpO2 97%   BMI 44.50 kg/m²       Physical Exam:   Constitutional: Patient is oriented to person, place, and time.   Skin: Warm and dry.  Well developed and nourished in no acute distress .  Head: Normocephalic and atraumatic.   Eyes: Pupils are equal, round, and reactive to light.   Neck: Neck supple. No bruit in the carotids, no elevation of JVD.  Cardiovascular: [place] in the fifth intercostal space, regular rate, and rhythm,  S1 greater than S2, no S3 or S4, no " gallop.  Pulmonary/Chest: Air entry is equal on both sides.  No wheezing or crackles.  Abdominal: Soft.  No hepatosplenomegaly, bowel sounds are present.  Musculoskeletal: No kyphoscoliosis.  Neurological: Alert and oriented to person, place, and time.  Cranial nerves are intact. No motor or sensory deficit.  Extremities: No edema, no radial femoral delay.  Psychiatric: Normal mood and affect. Behavior is normal.      Lab Results (last 24 hours)     Procedure Component Value Units Date/Time    COVID PRE-OP / PRE-PROCEDURE SCREENING ORDER (NO ISOLATION) - Swab, Nasopharynx [[id number]]  (Normal) Collected: 11/15/21 0941    Specimen: Swab from Nasopharynx Updated: 11/15/21 1419    Narrative:      The following orders were created for panel order COVID PRE-OP / PRE-PROCEDURE SCREENING ORDER (NO ISOLATION) - Swab, Nasopharynx.  Procedure                               Abnormality         Status                     ---------                               -----------         ------                     COVID-19, [facility] IN-...[[id number]]  Normal              Final result                 Please view results for these tests on the individual orders.    COVID-19, [facility] IN-HOUSE, NP SWAB IN TRANSPORT MEDIA 8-10 HR TAT - Swab, Nasopharynx [[id number]]  (Normal) Collected: 11/15/21 0941    Specimen: Swab from Nasopharynx Updated: 11/15/21 1419     COVID19 Not Detected    Narrative:      Testing performed by Real Time RT-PCR  This test has not been approved by the [facility] but is authorized under the Emergency Use Act (EUA)    Fact sheet for providers: https://www.fda.gov/media/861674/download    Fact sheet for patients: https://www.fda.gov/media/047054/download        Basic Metabolic Panel [[id number]]  (Abnormal) Collected: 11/16/21 0616    Specimen: Blood Updated: 11/16/21 0640     Glucose 131 mg/dL      BUN 18 mg/dL      Creatinine 0.81 mg/dL      Sodium 137 mmol/L      Potassium 4.3 mmol/L      Chloride 106 mmol/L      CO2  24.0 mmol/L      Calcium 8.9 mg/dL      eGFR Non African Amer 101 mL/min/1.73      BUN/Creatinine Ratio 22.2     Anion Gap 7.0 mmol/L     Narrative:      GFR Normal >60  Chronic Kidney Disease <60  Kidney Failure <15      CBC (No Diff) [[id number]]  (Normal) Collected: 21    Specimen: Blood Updated: 21     WBC 7.09 10*3/mm3      RBC 5.37 10*6/mm3      Hemoglobin 16.1 g/dL      Hematocrit 46.2 %      MCV 86.0 fL      MCH 30.0 pg      MCHC 34.8 g/dL      RDW 12.3 %      RDW-SD 38.1 fl      MPV 9.3 fL      Platelets 182 10*3/mm3     Protime-INR [[id number]]  (Normal) Collected: 21    Specimen: Blood Updated: 21     Protime 12.3 Seconds      INR 0.92    Narrative:      Therapeutic range for most indications is 2.0-3.0 INR,  or 2.5-3.5 for mechanical heart valves.          Results for orders placed in visit on 14    Echo - Converted    Narrative  [facility]      PT NAME:  [facility]  MR NUMBER:  [id number]  :  1970  ECHOCARDIOGRAM      MEDICAL OFFICE BUILDING  DATE:  2014    PRIMARY CARE PHYSICIAN:    ORDERING DOCTOR:  IFEOMA DEJESUS MD    REASON FOR ECHO:  Dyspnea  INTERPRETING  CARDIOLOGIST:  KAIA GARCIA M.D.    PRIOR ECHO:  No            DATE OF PRIOR ECHO:    PATIENT PROCEDURE #:       LOCATION OF STUDY:  [address] [place] [address]      QUANTITATIVE MEASUREMENTS    LA =    38     (<40)    LA/AO Ratio  1.2  Ao =    31     (<37)    AoV Open     24   (>15)  IVS=    10     (<11)    LVPW         10   (<11)  LVEDd=  52     (38-56)  LVEDs        35   (22-40)  RV=            (<26)    LVOT         2.4  EF=     55-60  %        %F.S.             (24-46)    Max. Pulmonary Gradient  6    mmHg  RV Systolic Pressure          mmHg    MV Area                  5.5  cm2  Mean MV Gradient         1    mmHg  MV p 1/2 t               40   msec    EVELYN (Doppler)            3.2  cm2  EVELYN (Planimeter)               cm2  Max. AV Gradient         5    mmHg  Mean AV Gradient         3    mmHg  AI p 1/2 t                    msec    DESCRIPTION:  Quality of study is fair.    FINDINGS  CHAMBER DIMENSIONS AND FUNCTION  1. Left ventricular systolic function is normal with ejection fraction  of 55% to 60%.  Left ventricular wall thickness is normal.  2. Left atrium is normal in size.  3. Right ventricular contractility and chamber dimensions are normal.  4. Right atrium is normal in size.      VALVES  1. Mitral valve leaflets are normal with intact valve excursion.  2. Aortic valve is trileaflet in structure with normal valve motion.  3. Pulmonic valve was not well visualized.    Color flow Doppler imaging reveals no evidence of valve regurgitation.  The spectral Doppler velocity measurements of the mitral valve inflow  reveals normal E:A wave amplitude ratio.    Pericardium is normal with no evidence of pericardial effusion.    IVC was not well visualized.    A:  1. Normal left ventricular systolic function.  Ejection fraction of 55%  to 60%.  2. No evidence of pericardial effusion.      KAIA GARCIA M.D.  Electronically Signed  03/27/2014 10:16:54  By KASSANDRA CEDEÑO/joseph  Dictated:   03/22/2014 0946  Transcribed:   03/24/2014 1229  Page #page      The ASCVD Risk score (Carmen DC Jr., et al., 2013) failed to calculate for the following reasons:    Cannot find a previous HDL lab    Cannot find a previous total cholesterol lab          A/P:    [facility] Pre-Op:    Invasive coronary angiography was recommended to the patient.  The patientdenies  bleeding issues. The patient reports use of antiplatelet agents.  The patient denies  CKD. The patient denies  contrast allergy. The patient denies  use of diabetes medications.    Pre-Cath Surgical History: NA    The indications, risks/benefits and alternatives of diagnostic left heart cardiac catheterization, angiography, conscious sedation, and possible blood transfusion were discussed in  detail with the patient. The potential complications of 1/2000 chance of death, 1/1000 chance of heart attack or stroke, 1/500 chance of bleeding or clotting of the femoral artery, and 1/500 chance of allergic reaction to contrast were discussed. We also reviewed possible complications of infection and kidney dysfunction. If PCI were performed and intra-coronary stents indicated, we discussed the details about SURAJ. This included a review of the risks of the infrequent, but relatively higher incidence of late thrombosis with SURAJ. The importance of maintaining a consistent daily regimen of aspirin and an additional anti-platelet agent for as long as directed after implantation was emphasized. No contraindications were found. The patient  appeared to understand and agreed to the above.  -Left heart catheterization, Coronary angiography, Graft angiography, In-situ LIMA angiography, Left ventriculography, Intravascular ultrasound, Optical coherence tomography, Flow wire, Balloon Angioplasty, Coronary stent, Graft stent, Aortography, Iliofemoral angiography, Device closure, femoral artery, Intra-aortic balloon pump, Impella LV assist device implantation, Transvenous pacemaker, Pericardiocentesis and Subclavian angiography      ASA Class: II  Mallampati Score: II      Contraindications to DAPT: None        Patient's Body mass index is 44.5 kg/m². indicating that he is morbidly obese (BMI > 40 or > 35 with obesity - related health condition). Obesity-related health conditions include the following: hypertension and coronary heart disease. Obesity is newly identified. BMI is is above average; BMI management plan is completed. We discussed portion control and increasing exercise..      Saknet Bishop  reports that he quit smoking about 14 years ago. His smoking use included cigarettes. He has never used smokeless tobacco.Janice Dawson MD  11/16/2021  08:14 CST      Part of this note may be an electronic  transcription/translation of spoken language to printed text using the Dragon Dictation System.

## 2021-11-16 NOTE — BRIEF OP NOTE
Successful PCI of LAD  Right radial approach  Normal left-sided filling pressures    TR band protocol.    Please refer to full dictation for details

## 2021-11-16 NOTE — PLAN OF CARE
Goal Outcome Evaluation:              Outcome Summary: right radial site clean, dry, intact, no hematoma.  pt tolerated procedure well no adse

## 2021-11-17 VITALS
TEMPERATURE: 97 F | HEART RATE: 63 BPM | OXYGEN SATURATION: 96 % | SYSTOLIC BLOOD PRESSURE: 122 MMHG | WEIGHT: 302.03 LBS | DIASTOLIC BLOOD PRESSURE: 68 MMHG | HEIGHT: 69 IN | BODY MASS INDEX: 44.73 KG/M2 | RESPIRATION RATE: 20 BRPM

## 2021-11-17 PROBLEM — I25.10 CORONARY ARTERY DISEASE INVOLVING NATIVE CORONARY ARTERY OF NATIVE HEART WITHOUT ANGINA PECTORIS: Status: ACTIVE | Noted: 2021-11-17

## 2021-11-17 LAB
ANION GAP SERPL CALCULATED.3IONS-SCNC: 7 MMOL/L (ref 5–15)
BUN SERPL-MCNC: 11 MG/DL (ref 6–20)
BUN/CREAT SERPL: 14.1 (ref 7–25)
CALCIUM SPEC-SCNC: 8.6 MG/DL (ref 8.6–10.5)
CHLORIDE SERPL-SCNC: 107 MMOL/L (ref 98–107)
CO2 SERPL-SCNC: 25 MMOL/L (ref 22–29)
CREAT SERPL-MCNC: 0.78 MG/DL (ref 0.76–1.27)
DEPRECATED RDW RBC AUTO: 38.3 FL (ref 37–54)
ERYTHROCYTE [DISTWIDTH] IN BLOOD BY AUTOMATED COUNT: 12.1 % (ref 12.3–15.4)
GFR SERPL CREATININE-BSD FRML MDRD: 106 ML/MIN/1.73
GLUCOSE SERPL-MCNC: 120 MG/DL (ref 65–99)
HCT VFR BLD AUTO: 45.4 % (ref 37.5–51)
HGB BLD-MCNC: 15.6 G/DL (ref 13–17.7)
MCH RBC QN AUTO: 29.8 PG (ref 26.6–33)
MCHC RBC AUTO-ENTMCNC: 34.4 G/DL (ref 31.5–35.7)
MCV RBC AUTO: 86.8 FL (ref 79–97)
PLATELET # BLD AUTO: 181 10*3/MM3 (ref 140–450)
PMV BLD AUTO: 9.4 FL (ref 6–12)
POTASSIUM SERPL-SCNC: 4.6 MMOL/L (ref 3.5–5.2)
RBC # BLD AUTO: 5.23 10*6/MM3 (ref 4.14–5.8)
SODIUM SERPL-SCNC: 139 MMOL/L (ref 136–145)
WBC # BLD AUTO: 6.99 10*3/MM3 (ref 3.4–10.8)

## 2021-11-17 PROCEDURE — 80048 BASIC METABOLIC PNL TOTAL CA: CPT | Performed by: INTERNAL MEDICINE

## 2021-11-17 PROCEDURE — 93005 ELECTROCARDIOGRAM TRACING: CPT | Performed by: INTERNAL MEDICINE

## 2021-11-17 PROCEDURE — 99217 PR OBSERVATION CARE DISCHARGE MANAGEMENT: CPT | Performed by: NURSE PRACTITIONER

## 2021-11-17 PROCEDURE — 85027 COMPLETE CBC AUTOMATED: CPT | Performed by: INTERNAL MEDICINE

## 2021-11-17 RX ORDER — PRASUGREL 10 MG/1
10 TABLET, FILM COATED ORAL DAILY
Qty: 30 TABLET | Refills: 3 | Status: SHIPPED | OUTPATIENT
Start: 2021-11-18 | End: 2021-12-20 | Stop reason: SDUPTHER

## 2021-11-17 RX ADMIN — METOPROLOL SUCCINATE 50 MG: 50 TABLET, EXTENDED RELEASE ORAL at 08:13

## 2021-11-17 RX ADMIN — PRASUGREL 10 MG: 10 TABLET, FILM COATED ORAL at 08:13

## 2021-11-17 RX ADMIN — ASPIRIN 81 MG: 81 TABLET, CHEWABLE ORAL at 08:13

## 2021-11-17 NOTE — DISCHARGE SUMMARY
[facility] CARDIOLOGY  [address] [place] [address]. [address]  T - [phone]     DISCHARGE SUMMARY         PATIENT  DEMOGRAPHICS   PATIENT NAME: Sanket Bishop                      PHYSICIAN: Dr. Dawson  : 1970  MRN: [id number]    ADMISSION/DISCHARGE INFO   ADMISSION DATE: 2021     DISCHARGE DATE: 21    ADMISSION DIAGNOSES:   Precordial chest pain [R07.2]  Abnormal findings diagnostic imaging of heart and coronary circulation [R93.1]    DISCHARGE DIAGNOSES:   1. Precordial chest pain    2. Abnormal findings diagnostic imaging of heart and coronary circulation        Precordial chest pain    Abnormal findings diagnostic imaging of heart and coronary circulation    Coronary artery disease involving native coronary artery of native heart without angina pectoris      ATTENDING PROVIDER:   Dr. Dawson       CONSULTS   Consult Orders (all) (From admission, onward)     Start     Ordered    21 1043  Cardiac Rehab Evaluation and Enrollment  Once        Provider:  (Not yet assigned)    21 1043               Consults     No orders found for last 30 day(s).          PROCEDURES       Results for orders placed during the hospital encounter of 21    Adult Transthoracic Echo Complete w/ Color, Spectral and Contrast if Necessary Per Protocol    Interpretation Summary  · Left ventricular ejection fraction appears to be 56 - 60%. Left ventricular systolic function is normal.  · Left ventricular diastolic function was normal.  · Estimated right ventricular systolic pressure from tricuspid regurgitation is normal (<35 mmHg).      XR Chest 1 View    Result Date: 2021  Bilateral atelectasis. Electronically signed by:  Efrain Guan MD  2021 9:23 PM CDT Workstation: [id number]    CT Angiogram Coronary    Result Date: 2021  CONCLUSION: LAD: Atherosclerotic calcification noted in the mid aspect resulting in greater than 70% stenosis. RCA: Limited  evaluation due to motion artifact in the mid to distal aspect. Large calcified plaque in the proximal to mid aspect resulting in 50-70% stenosis. Moderate calcified plaque in the distal aspect resulting in 50-70% stenosis. Focus of non calcified plaque with severe (>70%) stenosis vs motion artifact in the mid to distal RCA PDA: Moderate to large calcified plaque in the proximal aspect resulting in at least 50-70% stenosis. Electronically signed by:  Lei Fair MD  11/9/2021 12:00 PM [facility] Workstation: [id number]      Adult Transthoracic Echo Complete w/ Color, Spectral and Contrast if Necessary Per Protocol    Result Date: 11/16/2021  Narrative: · Left ventricular ejection fraction appears to be 56 - 60%. Left ventricular systolic function is normal. · Left ventricular diastolic function was normal. · Estimated right ventricular systolic pressure from tricuspid regurgitation is normal (<35 mmHg).      Cardiac Catheterization/Vascular Study    Result Date: 11/16/2021  Narrative: [facility] Cardiology CARDIAC CATHETERIZATION NOTE : Janice Dawson MD 11/16/2021 Procedure: 1.  Left heart catheterization 2.  Selective coronary angiography 3.  Successful PCI of proximal/mid LAD 4.  Successful PCI of distal LAD 5.  OCT 6.  RFR Indications: Angina CCS class III Abnormal CTA Site of Entry:  Right radial artery Catheters used: 6 F EBU 3.5 Guide and 5F [place] 4.0 Course: Informed consent was obtained from the patient after explaining risks/benefits and alternatives. The patient was brought to the cath lab and prepped and draped in the sterile fashion. Timeout was performed. Lidocaine was used for local anesthesia. Access was obtained in the access: Right radial artery using micropuncture and modified Seldinger technique and a  6Fr sheath was placed over the wire. Catheter exchanges were made over a .035 guide under fluoroscopic guidance. 6 F JL 3.5 was used to engage the Left main. Multiple  cineographic images were taken in orthogonal view. Subsequently 5F Tiger 4.0 was used to engage the RCA and multiple cineographic image were taken in orthogonal views. 5F Tiger 4.0 was used to cross the aortic valve. Filling pressures were recorded and pull back was performed. Images reviewed. Catheter removed over the wire. Details of RFR and PCI: 6 [place] EBU 3.5 guide catheter was used to engage the left main.  After confirming that ACT was therapeutic the RFR wire was advanced into the coronary artery.  Equalization was performed in the aorta.  Subsequently the wire was advanced across the lesion in the distal LAD.  RFR was recorded and noted to be positive at 0.86 consistent with hemodynamically obstructive coronary artery disease.  RFR pullback was performed which showed a significant stepup at the lesion in the distal LAD however there is also significant stepup in the disease segment of the mid LAD suggestive that both lesions were hemodynamically significant. After which a run-through wire was advanced across the lesion in the distal LAD.  Predilation was performed with a 2.5 compliant balloon after which a 2.5 x 18 mm Xience/skypoint stent was deployed at nominal pressure and postdilated with a 2.5 noncompliant balloon with excellent angiographic results reducing lesion from 80% to 0% with DOM-3 flow.  Since mid LAD has diffuse disease I elected to perform OCT for optimization of PCI.  OCT was performed to identify the proximal and distal landing zone.  After which a 3.5 x 33 mm Xience/huang point stent was deployed at nominal pressure and postdilated with a 3.5 noncompliant balloon at the distal segment and a 4.0 noncompliant balloon with proximal segment with excellent angiographic results reducing the lesion from 70% to 0%.  Repeat OCT showed mall apposition at the proximal edge after which a 4.5 compliant balloon was used for further post dilation of the proximal edge.  Final run of OCT showed good  stent expansion, apposition and no evidence of proximal or distal edge dissection. Final angiograms without evidence of guide catheter trauma wire perforation or edge dissection. Patient tolerated the procedure well. TR band was applied for hemostasis in the right radial artery. Findings: Hemodynamics: Aortic Pressure: 102/57 map of 74mmHg   LVEDP: 7mmhg  Gradient across aortic valve: None            Ventriculography: Not performed Selective coronary angiography: 1. Left Main: Left main is a large caliber vessel which arises from left coronary cusp and gives rise to LAD, ramus intermedius and LCX.  There is no angiographic evidence of obstructive coronary artery disease in the left main. DOM III flow was noted. Ramus intermedius is a small caliber less than 2 mm vessel without angiographic evidence of obstructive coronary arteries. 2. Left anterior descending coronary artery: LAD is a large caliber vessel which gives rise to several septal  and diagonal branches as it runs in anterior interventricular groove and wraps around the apex. Mid LAD at the takeoff of the first septal  has a long segment of eccentric 60 to 70% severity.  Distal to the mid LAD also has mild diffuse disease of about 40 to 50%. Proximal section of the distal LAD has a focal concentric stenosis about 70%. Apical LAD right before the terminal bifurcation has severe disease of about 70%.  This is less than 2 mm without any significant outflow and not amenable to PCI. DOM III flow was noted 3. Left circumflex coronary artery: Left circumflex is a medium caliber nondominant vessel which gives rise to medium caliber OM1, followed by a small caliber OM 2 and OM 3. Mid left circumflex is mild luminal irregularities none of which are more than 30%. OM1 is medium in caliber but long vessel with moderate tortuosity in the midsegment with mild luminal irregularities none of which are more than 30%. OM 2 and 3 are small in caliber.  DOM III flow was noted 4. Right Coronary artery: RCA is a large-caliber dominant vessel which gives rise to several small caliber RV marginal branches and bifurcates into a medium caliber RPDA and medium caliber RPL.  Proximal RCA has a long segment of moderate eccentric stenosis about 50% extending into the mid RCA. Distal RCA also has a focal eccentric stenosis about 30-40%. RPDA is medium in caliber with a mild eccentric stenosis about 50% in the midsegment. RPL V is medium in caliber giving rise to 3 small caliber RPL B branches without any angiographic evidence of obstructive coronary artery disease. DOM III flow was noted Conclusion: 1.  One-vessel obstructive coronary artery disease involving the mid LAD and distal LAD for which patient underwent successful PCI guided by RFR and OCT 2.  Moderate disease noted in the RCA 3.  Normal left-sided filling pressures Complications: None EBL: 5ml Specimen: None Recommmendations: 1.  Continue dual antiplatelet with aspirin/Effient for minimum of 12 months 2.  Risk factor modification for secondary prevention 3.  IV fluids per protocol for prevention of REBECA This document has been electronically signed by Janice Dawson MD on November 16, 2021 15:37 CST   Part of this note may be an electronic transcription/translation of spoken language to printed text using the Dragon Dictation System.     XR Chest 1 View    Result Date: 11/1/2021  Narrative: EXAM:   XR Chest, 1 View CLINICAL HISTORY:   The patient is 50 years old and is Male; Chest Pain triage protocol Chest Pain Triage Protocol TECHNIQUE:   Frontal view of the chest. COMPARISON:   3/1/2014 FINDINGS:   LUNGS:  Bilateral atelectasis.  No consolidation.   PLEURAL SPACE:  No pneumothorax or pleural effusion.   HEART:  Normal   MEDIASTINUM:  Normal   BONES/JOINTS:  Normal     Impression: Bilateral atelectasis. Electronically signed by:  Efrain Guan MD  11/1/2021 9:23 PM CDT Workstation: [id number]    CT Angiogram  Coronary    Result Date: 11/9/2021  Narrative: PROCEDURE: CT HEART CORONARY ANGIOGRAM WITH IV CONTRAST CLINICAL HISTORY: Chest pain, nonspecific , R07.2 Precordial pain: Chest pain/anginal equiv, 10yr CHD risk > 20%, not treadmill candidate COMPARISON: None. TECHNIQUE: Serial axial CT images were obtained through the heart at 3 mm thickness without contrast for calcium scoring. Subsequently, following the intravenous administration of 90 ml of Isovue-370, serial axial CT images were obtained through the heart at 0.6 mm thickness utilizing retrospective  gating. Post processing was performed by the radiologist at the [employer] workstation. 3D images including vessel probing technique were also obtained. Full field of view reconstructed images were used for evaluation of the extracardiac tissues. This exam was performed using radiation doses that are as low as reasonably achievable (ALARA). This exam was performed according to our departmental dose optimization program, which includes automated exposure control, adjustment of the mA and/or KV according to patient size and/or use of iterative reconstruction technique. FINDINGS: CALCIUM PLAQUE BURDEN: REGION                                         CALCIUM SCORE ([place]) Left Main                                                      0 Left Anterior Descending                           325 Circumflex                                                   88 Right Coronary Artery                                 534 Total calcium score is 947  Implication: Extensive atherosclerotic plaque Risk of coronary artery disease: High likelihood of at least one significant coronary narrowing CT FUNCTIONAL ANALYSIS: Ejection Fraction     45 % Diastolic Volume     139 ml Systolic Volume      76 ml Stroke Volume        63 ml Cardiac Output        4.0 L/minute CTA OF THE CORONARY ARTERIES: There is a type B LAD. Coronary anatomy is right dominant Left Main: No calcified or soft itssue density  "plaque and no stenosis. LAD: Moderate atherosclerotic calcification the proximal aspect results in approximately 50% luminal narrowing. Atherosclerotic calcification noted in the mid aspect resulting in greater than 70% stenosis. Circumflex: Small amount calcified plaque in the proximal aspect resulting in less than 50% luminal narrowing. RCA: Limited evaluation due to motion artifact in the mid to distal aspect. Large calcified plaque in the proximal to mid aspect resulting in 50-70% stenosis. Moderate calcified plaque in the distal aspect resulting in 50-70% stenosis. Focus of non calcified plaque with severe (>70%) stenosis vs motion artifact in the mid to distal RCA PDA: Moderate-sized calcified plaque in the proximal aspect resulting in at least 50-70% stenosis. ADDITIONAL FINDINGS: The aortic valve is tricuspid. There is no myocardial bridging. On short axis views, the myocardium is homogeneous in thickness. Two 4 mm lung nodules noted in the right upper lobe. Fatty liver. Slightly enlarged right hilar lymph node measuring 1.6 cm in short axis.     Impression: CONCLUSION: LAD: Atherosclerotic calcification noted in the mid aspect resulting in greater than 70% stenosis. RCA: Limited evaluation due to motion artifact in the mid to distal aspect. Large calcified plaque in the proximal to mid aspect resulting in 50-70% stenosis. Moderate calcified plaque in the distal aspect resulting in 50-70% stenosis. Focus of non calcified plaque with severe (>70%) stenosis vs motion artifact in the mid to distal RCA PDA: Moderate to large calcified plaque in the proximal aspect resulting in at least 50-70% stenosis. Electronically signed by:  Lei Fair MD  11/9/2021 12:00 PM [facility] Workstation: [id number]      HISTORY OF PRESENT ILLNESS   From My office note:  \"History of Present Illness     Mr. Bishop is a 50 year old patient who presents to [facility] as an ER follow up for chest pain.   He has chest pain " "chronically for 3 months or more. He has this pain almost daily. IT is reproducible on his right side when he lies down, bends over, or picks something up.   This midsternal pain is sharp and intermittent. This pain is usually moderate and controlled.   His brother had open heart surgery last year at 49 and is not a DM or smoker. His dad was in his 60s with MI and CABG in [place].      He has gained weight. He is not on medication. The BB was ordered a while ago because his HR was high due to hyperthyroidism.      Today I added Lipitor, ASA, and Toprol XL for his angina. CCTA was ordered. Echo from 2014 reviewed, without structural changes. \"    DIAGNOSTIC DATA     Procedure(s):  Left Heart Cath  11/16/2021        Procedure:  1.  Left heart catheterization  2.  Selective coronary angiography  3.  Successful PCI of proximal/mid LAD  4.  Successful PCI of distal LAD  5.  OCT  6.  RFR     Indications:   Angina CCS class III  Abnormal CTA     Site of Entry:  Right radial artery     Catheters used: 6 F EBU 3.5 Guide and 5F [place] 4.0     Course: Informed consent was obtained from the patient after explaining risks/benefits and alternatives. The patient was brought to the cath lab and prepped and draped in the sterile fashion. Timeout was performed. Lidocaine was used for local anesthesia. Access was obtained in the access: Right radial artery using micropuncture and modified Seldinger technique and a  6Fr sheath was placed over the wire. Catheter exchanges were made over a .035 guide under fluoroscopic guidance. 6 F JL 3.5 was used to engage the Left main. Multiple cineographic images were taken in orthogonal view. Subsequently 5F Tiger 4.0 was used to engage the RCA and multiple cineographic image were taken in orthogonal views. 5F Tiger 4.0 was used to cross the aortic valve. Filling pressures were recorded and pull back was performed. Images reviewed.   Catheter removed over the wire.      Details of RFR and PCI:  6 " [place] EBU 3.5 guide catheter was used to engage the left main.  After confirming that ACT was therapeutic the RFR wire was advanced into the coronary artery.  Equalization was performed in the aorta.  Subsequently the wire was advanced across the lesion in the distal LAD.  RFR was recorded and noted to be positive at 0.86 consistent with hemodynamically obstructive coronary artery disease.  RFR pullback was performed which showed a significant stepup at the lesion in the distal LAD however there is also significant stepup in the disease segment of the mid LAD suggestive that both lesions were hemodynamically significant.     After which a run-through wire was advanced across the lesion in the distal LAD.  Predilation was performed with a 2.5 compliant balloon after which a 2.5 x 18 mm Xience/skypoint stent was deployed at nominal pressure and postdilated with a 2.5 noncompliant balloon with excellent angiographic results reducing lesion from 80% to 0% with DOM-3 flow.    Since mid LAD has diffuse disease I elected to perform OCT for optimization of PCI.  OCT was performed to identify the proximal and distal landing zone.  After which a 3.5 x 33 mm Xience/huang point stent was deployed at nominal pressure and postdilated with a 3.5 noncompliant balloon at the distal segment and a 4.0 noncompliant balloon with proximal segment with excellent angiographic results reducing the lesion from 70% to 0%.  Repeat OCT showed mall apposition at the proximal edge after which a 4.5 compliant balloon was used for further post dilation of the proximal edge.  Final run of OCT showed good stent expansion, apposition and no evidence of proximal or distal edge dissection.  Final angiograms without evidence of guide catheter trauma wire perforation or edge dissection.     Patient tolerated the procedure well.     TR band was applied for hemostasis in the right radial artery.        Findings:   Hemodynamics:  Aortic Pressure: 102/57 map  of 74mmHg     LVEDP: 7mmhg    Gradient across aortic valve: None                                            Ventriculography:   Not performed     Selective coronary angiography:  1. Left Main:   Left main is a large caliber vessel which arises from left coronary cusp and gives rise to LAD, ramus intermedius and LCX.  There is no angiographic evidence of obstructive coronary artery disease in the left main.  DOM III flow was noted.   Ramus intermedius is a small caliber less than 2 mm vessel without angiographic evidence of obstructive coronary arteries.     2. Left anterior descending coronary artery:  LAD is a large caliber vessel which gives rise to several septal  and diagonal branches as it runs in anterior interventricular groove and wraps around the apex.  Mid LAD at the takeoff of the first septal  has a long segment of eccentric 60 to 70% severity.  Distal to the mid LAD also has mild diffuse disease of about 40 to 50%.  Proximal section of the distal LAD has a focal concentric stenosis about 70%.  Apical LAD right before the terminal bifurcation has severe disease of about 70%.  This is less than 2 mm without any significant outflow and not amenable to PCI.  DOM III flow was noted     3. Left circumflex coronary artery:  Left circumflex is a medium caliber nondominant vessel which gives rise to medium caliber OM1, followed by a small caliber OM 2 and OM 3.  Mid left circumflex is mild luminal irregularities none of which are more than 30%.  OM1 is medium in caliber but long vessel with moderate tortuosity in the midsegment with mild luminal irregularities none of which are more than 30%.  OM 2 and 3 are small in caliber.  DOM III flow was noted     4. Right Coronary artery:  RCA is a large-caliber dominant vessel which gives rise to several small caliber RV marginal branches and bifurcates into a medium caliber RPDA and medium caliber RPL.  Proximal RCA has a long segment of moderate  eccentric stenosis about 50% extending into the mid RCA.  Distal RCA also has a focal eccentric stenosis about 30-40%.  RPDA is medium in caliber with a mild eccentric stenosis about 50% in the midsegment.  RPL V is medium in caliber giving rise to 3 small caliber RPL B branches without any angiographic evidence of obstructive coronary artery disease.  DOM III flow was noted     Conclusion:  1.  One-vessel obstructive coronary artery disease involving the mid LAD and distal LAD for which patient underwent successful PCI guided by RFR and OCT   2.  Moderate disease noted in the RCA  3.  Normal left-sided filling pressures     Complications:  None  EBL: 5ml   Specimen: None     Recommmendations:   1.  Continue dual antiplatelet with aspirin/Effient for minimum of 12 months  2.  Risk factor modification for secondary prevention  3.  IV fluids per protocol for prevention of REBECA                       Results from last 7 days   Lab Units 11/17/21  0557 11/16/21  0616   SODIUM mmol/L 139 137   POTASSIUM mmol/L 4.6 4.3   CHLORIDE mmol/L 107 106   CO2 mmol/L 25.0 24.0   BUN mg/dL 11 18   CREATININE mg/dL 0.78 0.81   CALCIUM mg/dL 8.6 8.9   GLUCOSE mg/dL 120* 131*       Estimated Creatinine Clearance: 157.5 mL/min (by C-G formula based on SCr of 0.78 mg/dL).          Results from last 7 days   Lab Units 11/17/21  0557 11/16/21  0616   WBC 10*3/mm3 6.99 7.09   HEMOGLOBIN g/dL 15.6 16.1   PLATELETS 10*3/mm3 181 182       Results from last 7 days   Lab Units 11/16/21  0616   INR  0.92       Lab Results   Component Value Date    TROPONINT <0.010 11/01/2021     Lab Results   Component Value Date    PROBNP 10.8 11/01/2021       I/O last 3 completed shifts:  In: 700 [P.O.:700]  Out: -       HOSPITAL COURSE   Patient is a 51 y.o. male presented with exertional angina with strong family history of heart disease.  He underwent a CTA coronary as an outpatient which showed triple-vessel disease.  He underwent a left heart cath yesterday  11/16/2021 there is right radial wrist.  He was found to have LAD disease for which a 2.5 x 18 mm Xience/skypoint stent was placed.  OCT was used for optimization and additional 3.5 x 33 mm Xience/huang point stent was placed.  It was postdilated with balloon and achieved excellent results.  See cath report for more detailed description.  Mr. Bishop stayed overnight in the 3 W. telemetry for cardiac monitoring.  The next morning Mr. Bishop had normal EKG normal lab work.  He had ambulated without difficulty or chest pain.  He was given discharge instructions with his wife at his bedside.  He had very strong migraine response to the Imdur 30 mg I started as an outpatient.  This medication was discontinued.  Should he have any ongoing chest pain he will call this office to let us know so additional medications can be optimized.  He was started on Effient, continue aspirin and statin.  He is to continue toprol XL.  Meds to beds was utilized for the Effient.     He will follow-up with me in 3 weeks and then with Dr. Dawson following that.  We discussed dietary changes and weight loss during discharge education.     DISCHARGE CONDITION   Condition on Discharge: Stable    Vital Signs  Temp:  [97 °F (36.1 °C)-98.6 °F (37 °C)] 97 °F (36.1 °C)  Heart Rate:  [63-91] 63  Resp:  [20] 20  BP: (122-151)/(68-79) 122/68    Physical Exam:  Physical Exam  Vitals and nursing note reviewed.   Constitutional:       General: He is not in acute distress.     Appearance: He is well-developed. He is not diaphoretic.   HENT:      Head: Normocephalic.   Eyes:      Conjunctiva/sclera: Conjunctivae normal.   Neck:      Vascular: No JVD.   Cardiovascular:      Rate and Rhythm: Normal rate and regular rhythm.      Heart sounds: Normal heart sounds, S1 normal and S2 normal. No murmur heard.  No friction rub. No gallop.    Pulmonary:      Effort: Pulmonary effort is normal. No respiratory distress.      Breath sounds: Normal breath sounds. No  wheezing or rales.   Abdominal:      General: Bowel sounds are normal. There is no distension.      Palpations: Abdomen is soft.   Musculoskeletal:         General: Normal range of motion.   Skin:     General: Skin is warm and dry.      Findings: No erythema.   Neurological:      Mental Status: He is alert and oriented to person, place, and time.   Psychiatric:         Behavior: Behavior normal.         Thought Content: Thought content normal.         Judgment: Judgment normal.         DISPOSITION   To Home      DISCHARGE MEDICATIONS        Your medication list      START taking these medications      Instructions Last Dose Given Next Dose Due   prasugrel 10 MG tablet  Commonly known as: EFFIENT  Start taking on: November 18, 2021      Take 1 tablet by mouth Daily.          CONTINUE taking these medications      Instructions Last Dose Given Next Dose Due   aspirin 81 MG EC tablet      Take 1 tablet by mouth Daily.       atorvastatin 10 MG tablet  Commonly known as: LIPITOR      Take 1 tablet by mouth Daily.       metoprolol succinate XL 50 MG 24 hr tablet  Commonly known as: TOPROL-XL      Take 1 tablet by mouth Daily.          STOP taking these medications    isosorbide mononitrate 30 MG 24 hr tablet  Commonly known as: IMDUR              Where to Get Your Medications      These medications were sent to [facility]  [address] [place] [address]    Hours: Monday through Friday 7:00am to 5:00pm Phone: [phone] ·   prasugrel 10 MG tablet         INSTRUCTIONS   Activity:   Activity Instructions     Activity as Tolerated      Lifting Restrictions      Type of Restriction: Lifting    Lifting Restrictions: Lifting Restriction (Indicate Limit)    Weight Limit (Pounds): 10    Length of Lifting Restriction: 5 days          Diet:   Diet Instructions     Diet: Regular      Discharge Diet: Regular          Patient instructions not to lift more than 10 pounds for 1  week.  Patient instructions not to soak cath site for 1 week.  Patient given instructions to report signs and symptoms of infection including but not limited to purulent drainage, tenderness, fever greater than 101 degrees.   Patient instructions regarding importance of medication compliance.  Patient was counseled on medications (new and old) with side effects.  Discussed signs symptoms to report.   Should you experience return of chest pain, present to the nearest emergency room.     Special Instructions: Patient instructed to call M.D. or return to ED with worsening shortness of breath, chest pain, fever greater than 101°F or any other medical concerns.    FOLLOW UP    Follow-up Information     Alcon Tubbs MD. Go on 11/19/2021.    Specialty: Internal Medicine  Why: FRIDAY NOVEMBER 19TH 2:00 HOSPITAL FOLLOW UP APPOINTMENT  Contact information:  [address]ine ROMANO  [facility] [id number] [phone]                         Follow-up Appointments  Future Appointments   Date Time Provider Department Center   12/7/2021  9:30 AM Neelima Garner APRN [facility] None          PENDING TEST RESULTS AT DISCHARGE          TIME   Time: 35 minutes were spent planning this discharge.              Dr. Dawson is the attending at time of discharge, He is aware of the patient's status and agrees with the above discharge summary.     Electronically signed by CHENG Mcgovern, 11/17/21, 9:50 AM CST.            This document has been electronically signed by CHENG Mcgovern on November 17, 2021 14:20 CST

## 2021-12-06 NOTE — PROGRESS NOTES
[facility] [place]IOLOGY OFFICE VISIT    Coronary Artery Disease      History of Present Illness    11/8/21: Mr. Bishop is a 50 year old patient who presents to [facility] as an ER follow up for chest pain.   He has chest pain chronically for 3 months or more. He has this pain almost daily. IT is reproducible on his right side when he lies down, bends over, or picks something up.   This midsternal pain is sharp and intermittent. This pain is usually moderate and controlled.   His brother had open heart surgery last year at 49 and is not a DM or smoker. His dad was in his 60s with MI and CABG in [place].      He has gained weight. He is not on medication. The BB was ordered a while ago because his HR was high due to hyperthyroidism.     Today I added Lipitor, ASA, and Toprol XL for his angina. CCTA was ordered. Echo from 2014 reviewed, without structural changes.     12/6/21: Returns following C where stent was placed in mid/distal LAD following abnormal CCTA and chest pain complaint. He had immediate improvement in chest pressure.   Conclusion:  1.  One-vessel obstructive coronary artery disease involving the mid LAD and distal LAD for which patient underwent successful PCI guided by RFR and OCT   2.  Moderate disease noted in the RCA  3.  Normal left-sided filling pressures     Today- He is coughing significantly, no fever. He thinks it is allergies, it appears to be a cold. His wife also had it. Likely COVID. Will prescribe bromphed to aid in his OTC treatments of mucinex and robutussin.    He still feels fatigued. His chest pain is gone, but he doesn't feel as good and he thought he would. He thinks the 50mg Toprol contributed. He decreased to 25mg and this has helped.   I think with cardiac rehab, weight loss, and exercise he will continue to feel better.      Cardiac Risk Factors:  The ASCVD Risk score (Carmen DC  Jr. et al., 2013) failed to calculate for the following reasons:    The valid total cholesterol range is 130 to 320 mg/dL      Past Medical History:   Diagnosis Date   • Thyroid trouble      Past Surgical History:   Procedure Laterality Date   • AMPUTATION     • APPENDECTOMY     • CARDIAC CATHETERIZATION N/A 2021    Procedure: Left Heart Cath;  Surgeon: Janice Dawson MD;  Location: [facility] CATH INVASIVE LOCATION;  Service: Cardiology;  Laterality: N/A;     Social History     Socioeconomic History   • Marital status:    Tobacco Use   • Smoking status: Former Smoker     Types: Cigarettes     Quit date: 3/1/2007     Years since quittin.7   • Smokeless tobacco: Never Used   Substance and Sexual Activity   • Alcohol use: Never   • Drug use: Never   • Sexual activity: Defer     Family History   Problem Relation Age of Onset   • Kidney disease Mother    • Heart disease Father    • Heart attack Father    • Heart disease Brother        ALLERGIES:  Allergies   Allergen Reactions   • Hydrocodone-Acetaminophen Anxiety     Reaction: anxiety       Advance Care Planning   ACP discussion was declined by the patient. Patient does not have an advance directive, declines further assistance.       Review of Systems   Constitutional: Negative for chills, decreased appetite and fever.   HENT: Negative.    Eyes: Negative.    Cardiovascular: Negative for chest pain, claudication, dyspnea on exertion, irregular heartbeat, leg swelling and palpitations.   Respiratory: Positive for cough. Negative for shortness of breath and wheezing.    Endocrine: Negative.    Skin: Negative for dry skin, flushing and rash.   Musculoskeletal: Negative for falls and myalgias.   Gastrointestinal: Negative for abdominal pain, change in bowel habit and melena.   Genitourinary: Negative for frequency and hematuria.   Neurological: Negative for dizziness, light-headedness, loss of balance and weakness.   Psychiatric/Behavioral: Negative for  "altered mental status and memory loss. The patient is not nervous/anxious.        Current Outpatient Medications   Medication Sig Dispense Refill   • aspirin (aspirin) 81 MG EC tablet Take 1 tablet by mouth Daily. 30 tablet 6   • atorvastatin (LIPITOR) 10 MG tablet Take 1 tablet by mouth Daily. 30 tablet 11   • metoprolol succinate XL (TOPROL-XL) 25 MG 24 hr tablet Take 1 tablet by mouth Daily. 90 tablet 3   • prasugrel (EFFIENT) 10 MG tablet Take 1 tablet by mouth Daily. 30 tablet 3   • brompheniramine-pseudoephedrine-DM 30-2-10 MG/5ML syrup Take 2.5 mL by mouth 4 (Four) Times a Day As Needed for Cough or Allergies. 473 mL 0     No current facility-administered medications for this visit.       OBJECTIVE:    Physical Exam:   Constitutional:       General: Not in acute distress.     Appearance: Well-developed.   HENT:      Head: Normocephalic and atraumatic.   Neck:      Vascular: No JVD.   Pulmonary:      Effort: Pulmonary effort is normal. No respiratory distress.      Breath sounds: Normal breath sounds. No wheezing. No rales.   Cardiovascular:      Normal rate. Regular rhythm.   Pulses:     Intact distal pulses.   Edema:     Peripheral edema absent.   Abdominal:      General: Bowel sounds are normal.      Palpations: Abdomen is soft.   Musculoskeletal: Normal range of motion.      Cervical back: Normal range of motion. Skin:     General: Skin is warm and dry.      Findings: No erythema.   Neurological:      Mental Status: Alert and oriented to person, place, and time.   Psychiatric:         Behavior: Behavior normal.         Thought Content: Thought content normal.         Judgment: Judgment normal.       Vitals:    12/07/21 0934   BP: 134/84   BP Location: Left arm   Patient Position: Sitting   Cuff Size: Adult   Pulse: 94   Temp: 97.5 °F (36.4 °C)   SpO2: 98%   Weight: (!) 138 kg (303 lb 12.8 oz)   Height: 175.3 cm (69\")       DATA REVIEWED by me:   Results for orders placed during the hospital encounter of " 11/16/21    Adult Transthoracic Echo Complete w/ Color, Spectral and Contrast if Necessary Per Protocol    Interpretation Summary  · Left ventricular ejection fraction appears to be 56 - 60%. Left ventricular systolic function is normal.  · Left ventricular diastolic function was normal.  · Estimated right ventricular systolic pressure from tricuspid regurgitation is normal (<35 mmHg).      CT Angiogram Coronary    Result Date: 11/9/2021  CONCLUSION: LAD: Atherosclerotic calcification noted in the mid aspect resulting in greater than 70% stenosis. RCA: Limited evaluation due to motion artifact in the mid to distal aspect. Large calcified plaque in the proximal to mid aspect resulting in 50-70% stenosis. Moderate calcified plaque in the distal aspect resulting in 50-70% stenosis. Focus of non calcified plaque with severe (>70%) stenosis vs motion artifact in the mid to distal RCA PDA: Moderate to large calcified plaque in the proximal aspect resulting in at least 50-70% stenosis. Electronically signed by:  Lei Fair MD  11/9/2021 12:00 PM CST Workstation: [id number]       11/16/2021        Procedure:  1.  Left heart catheterization  2.  Selective coronary angiography  3.  Successful PCI of proximal/mid LAD  4.  Successful PCI of distal LAD  5.  OCT  6.  RFR     Indications:   Angina CCS class III  Abnormal CTA     Site of Entry:  Right radial artery     Catheters used: 6 F EBU 3.5 Guide and 5F [place] 4.0     Course: Informed consent was obtained from the patient after explaining risks/benefits and alternatives. The patient was brought to the cath lab and prepped and draped in the sterile fashion. Timeout was performed. Lidocaine was used for local anesthesia. Access was obtained in the access: Right radial artery using micropuncture and modified Seldinger technique and a  6Fr sheath was placed over the wire. Catheter exchanges were made over a .035 guide under fluoroscopic guidance. 6 F JL 3.5 was used to engage  the Left main. Multiple cineographic images were taken in orthogonal view. Subsequently 5F Tiger 4.0 was used to engage the RCA and multiple cineographic image were taken in orthogonal views. 5F Tiger 4.0 was used to cross the aortic valve. Filling pressures were recorded and pull back was performed. Images reviewed.   Catheter removed over the wire.      Details of RFR and PCI:  6 [place] EBU 3.5 guide catheter was used to engage the left main.  After confirming that ACT was therapeutic the RFR wire was advanced into the coronary artery.  Equalization was performed in the aorta.  Subsequently the wire was advanced across the lesion in the distal LAD.  RFR was recorded and noted to be positive at 0.86 consistent with hemodynamically obstructive coronary artery disease.  RFR pullback was performed which showed a significant stepup at the lesion in the distal LAD however there is also significant stepup in the disease segment of the mid LAD suggestive that both lesions were hemodynamically significant.     After which a run-through wire was advanced across the lesion in the distal LAD.  Predilation was performed with a 2.5 compliant balloon after which a 2.5 x 18 mm Xience/skypoint stent was deployed at nominal pressure and postdilated with a 2.5 noncompliant balloon with excellent angiographic results reducing lesion from 80% to 0% with DOM-3 flow.    Since mid LAD has diffuse disease I elected to perform OCT for optimization of PCI.  OCT was performed to identify the proximal and distal landing zone.  After which a 3.5 x 33 mm Xience/huang point stent was deployed at nominal pressure and postdilated with a 3.5 noncompliant balloon at the distal segment and a 4.0 noncompliant balloon with proximal segment with excellent angiographic results reducing the lesion from 70% to 0%.  Repeat OCT showed mall apposition at the proximal edge after which a 4.5 compliant balloon was used for further post dilation of the proximal  edge.  Final run of OCT showed good stent expansion, apposition and no evidence of proximal or distal edge dissection.  Final angiograms without evidence of guide catheter trauma wire perforation or edge dissection.     Patient tolerated the procedure well.     TR band was applied for hemostasis in the right radial artery.        Findings:   Hemodynamics:  Aortic Pressure: 102/57 map of 74mmHg     LVEDP: 7mmhg    Gradient across aortic valve: None                                            Ventriculography:   Not performed     Selective coronary angiography:  1. Left Main:   Left main is a large caliber vessel which arises from left coronary cusp and gives rise to LAD, ramus intermedius and LCX.  There is no angiographic evidence of obstructive coronary artery disease in the left main.  DOM III flow was noted.   Ramus intermedius is a small caliber less than 2 mm vessel without angiographic evidence of obstructive coronary arteries.     2. Left anterior descending coronary artery:  LAD is a large caliber vessel which gives rise to several septal  and diagonal branches as it runs in anterior interventricular groove and wraps around the apex.  Mid LAD at the takeoff of the first septal  has a long segment of eccentric 60 to 70% severity.  Distal to the mid LAD also has mild diffuse disease of about 40 to 50%.  Proximal section of the distal LAD has a focal concentric stenosis about 70%.  Apical LAD right before the terminal bifurcation has severe disease of about 70%.  This is less than 2 mm without any significant outflow and not amenable to PCI.  DOM III flow was noted     3. Left circumflex coronary artery:  Left circumflex is a medium caliber nondominant vessel which gives rise to medium caliber OM1, followed by a small caliber OM 2 and OM 3.  Mid left circumflex is mild luminal irregularities none of which are more than 30%.  OM1 is medium in caliber but long vessel with moderate tortuosity  in the midsegment with mild luminal irregularities none of which are more than 30%.  OM 2 and 3 are small in caliber.  DOM III flow was noted     4. Right Coronary artery:  RCA is a large-caliber dominant vessel which gives rise to several small caliber RV marginal branches and bifurcates into a medium caliber RPDA and medium caliber RPL.  Proximal RCA has a long segment of moderate eccentric stenosis about 50% extending into the mid RCA.  Distal RCA also has a focal eccentric stenosis about 30-40%.  RPDA is medium in caliber with a mild eccentric stenosis about 50% in the midsegment.  RPL V is medium in caliber giving rise to 3 small caliber RPL B branches without any angiographic evidence of obstructive coronary artery disease.  DOM III flow was noted     Conclusion:  1.  One-vessel obstructive coronary artery disease involving the mid LAD and distal LAD for which patient underwent successful PCI guided by RFR and OCT   2.  Moderate disease noted in the RCA  3.  Normal left-sided filling pressures     Complications:  None  EBL: 5ml   Specimen: None     Recommmendations:   1.  Continue dual antiplatelet with aspirin/Effient for minimum of 12 months  2.  Risk factor modification for secondary prevention  3.  IV fluids per protocol for prevention of REBECA       Labs Reviewed by me: BMP, CBC, LIPID, TSH  Lab Results   Component Value Date    GLUCOSE 120 (H) 11/17/2021    CALCIUM 8.6 11/17/2021     11/17/2021    K 4.6 11/17/2021    CO2 25.0 11/17/2021     11/17/2021    BUN 11 11/17/2021    CREATININE 0.78 11/17/2021    EGFRIFNONA 106 11/17/2021    BCR 14.1 11/17/2021    ANIONGAP 7.0 11/17/2021     Lab Results   Component Value Date    WBC 6.99 11/17/2021    HGB 15.6 11/17/2021    HCT 45.4 11/17/2021    MCV 86.8 11/17/2021     11/17/2021     Lab Results   Component Value Date    CHOL 125 11/16/2021     Lab Results   Component Value Date    TRIG 219 (H) 11/16/2021     Lab Results   Component Value Date     HDL 38 (L) 11/16/2021     No components found for: LDLCALC  Lab Results   Component Value Date    LDL 52 11/16/2021     No results found for: HDLLDLRATIO  No components found for: CHOLHDL  Lab Results   Component Value Date    TSH 0.414 11/16/2021     Lab Results   Component Value Date    PROBNP 10.8 11/01/2021       EKG:         The following portions of the patient's history were reviewed and updated as appropriate: allergies, current medications, past family history, past medical history, past social history, past surgical history and problem list.  Old records that were reviewed and pertinent information is included in the above objective data.     ASSESSMENT/PLAN:       Diagnosis Plan   1. Coronary artery disease involving native coronary artery of native heart without angina pectoris  ASCAD.   Continue current treatment regimen.  Dietary sodium restriction.  Weight loss.  Regular aerobic exercise.  Continue current medications.  Antiplatelet therapy: Effient 10mg daily    Beta-blocker:   Toprol XL 25mg- decreased from 50mg    Statin: Lipitor 10mg     Aspirin: 81mg daily    Nitrate: N/A    Further evaluation per orders.    CCS Functional Classification of Angina   Class Activity Evoking Angina Limits to Physical Activity   I Prolonged exertion None   II Walking > 2 blocks or > 1 flight of stairs Slight   III Walking < 2 blocks or < 1 flight of stairs Marked   IV Minimal or at rest Severe       He has lost some weight.     Patient's Body mass index is 44.86 kg/m². indicating that he is severely obese (BMI >30). Obesity-related health conditions include the following: CAD   Obesity is unchanged. BMI is is above average; BMI management plan is completed. We discussed portion control and increasing exercise.    Ozempic 0.25mg SQ weekly, samples given.          Financial assistance packet given to him to fill out obtained from [employer]. He was denied medicaid. Self pay.      Follow up 6 months.     I spent  35 minutes caring for [unfilled]  on this date of service. This time includes time spent by me in the following activities: preparing for the visit, reviewing tests, performing a medically appropriate examination and/or evaluation, ordering medications, tests, or procedures, documenting information in the medical record, care coordination and getting materials from .             This document has been electronically signed by CHENG Mcgovern on December 7, 2021 09:53 CST

## 2021-12-07 ENCOUNTER — OFFICE VISIT (OUTPATIENT)
Dept: CARDIOLOGY | Facility: CLINIC | Age: 51
End: 2021-12-07

## 2021-12-07 VITALS
HEART RATE: 94 BPM | SYSTOLIC BLOOD PRESSURE: 134 MMHG | WEIGHT: 303.8 LBS | BODY MASS INDEX: 45 KG/M2 | OXYGEN SATURATION: 98 % | HEIGHT: 69 IN | TEMPERATURE: 97.5 F | DIASTOLIC BLOOD PRESSURE: 84 MMHG

## 2021-12-07 DIAGNOSIS — I25.10 CORONARY ARTERY DISEASE INVOLVING NATIVE CORONARY ARTERY OF NATIVE HEART WITHOUT ANGINA PECTORIS: Primary | ICD-10-CM

## 2021-12-07 PROCEDURE — 99214 OFFICE O/P EST MOD 30 MIN: CPT | Performed by: NURSE PRACTITIONER

## 2021-12-07 RX ORDER — BROMPHENIRAMINE MALEATE, PSEUDOEPHEDRINE HYDROCHLORIDE, AND DEXTROMETHORPHAN HYDROBROMIDE 2; 30; 10 MG/5ML; MG/5ML; MG/5ML
2.5 SYRUP ORAL 4 TIMES DAILY PRN
Qty: 473 ML | Refills: 0 | Status: SHIPPED | OUTPATIENT
Start: 2021-12-07 | End: 2022-06-07

## 2021-12-07 RX ORDER — SEMAGLUTIDE 1.34 MG/ML
0.25 INJECTION, SOLUTION SUBCUTANEOUS WEEKLY
Qty: 1 PEN | Refills: 1 | COMMUNITY
Start: 2021-12-07 | End: 2022-06-07

## 2021-12-07 RX ORDER — METOPROLOL SUCCINATE 25 MG/1
25 TABLET, EXTENDED RELEASE ORAL DAILY
Qty: 90 TABLET | Refills: 3 | Status: SHIPPED | OUTPATIENT
Start: 2021-12-07 | End: 2022-12-09 | Stop reason: SDUPTHER

## 2021-12-13 LAB
QT INTERVAL: 388 MS
QTC INTERVAL: 409 MS

## 2021-12-20 ENCOUNTER — TELEPHONE (OUTPATIENT)
Dept: CARDIOLOGY | Facility: CLINIC | Age: 51
End: 2021-12-20

## 2021-12-20 RX ORDER — PRASUGREL 10 MG/1
10 TABLET, FILM COATED ORAL DAILY
Qty: 90 TABLET | Refills: 3 | Status: SHIPPED | OUTPATIENT
Start: 2021-12-20 | End: 2022-12-09 | Stop reason: SDUPTHER

## 2021-12-20 NOTE — TELEPHONE ENCOUNTER
----- Message from Mynor Scott sent at 12/20/2021  2:00 PM CST -----  [facility]     Prasugrel 10 mgs they need refills called in please.     Thanks    Mynor

## 2022-06-07 ENCOUNTER — OFFICE VISIT (OUTPATIENT)
Dept: CARDIOLOGY | Facility: CLINIC | Age: 52
End: 2022-06-07

## 2022-06-07 VITALS
DIASTOLIC BLOOD PRESSURE: 84 MMHG | BODY MASS INDEX: 46.65 KG/M2 | HEART RATE: 83 BPM | SYSTOLIC BLOOD PRESSURE: 122 MMHG | HEIGHT: 69 IN | OXYGEN SATURATION: 98 % | WEIGHT: 315 LBS

## 2022-06-07 DIAGNOSIS — I25.10 CORONARY ARTERY DISEASE INVOLVING NATIVE CORONARY ARTERY OF NATIVE HEART WITHOUT ANGINA PECTORIS: Primary | ICD-10-CM

## 2022-06-07 PROCEDURE — 99214 OFFICE O/P EST MOD 30 MIN: CPT | Performed by: NURSE PRACTITIONER

## 2022-06-07 NOTE — PROGRESS NOTES
Coronary Artery Disease (Chief Complaint)      History of Present Illness  The below note was copied from Neelima ANAND's notes for continuum of care.  11/8/21: Mr. Bishop is a 50 year old patient who presents to [facility] as an ER follow up for chest pain.   He has chest pain chronically for 3 months or more. He has this pain almost daily. IT is reproducible on his right side when he lies down, bends over, or picks something up.   This midsternal pain is sharp and intermittent. This pain is usually moderate and controlled.   His brother had open heart surgery last year at 49 and is not a DM or smoker. His dad was in his 60s with MI and CABG in [place].      He has gained weight. He is not on medication. The BB was ordered a while ago because his HR was high due to hyperthyroidism.      Today I added Lipitor, ASA, and Toprol XL for his angina. CCTA was ordered. Echo from 2014 reviewed, without structural changes.      12/6/21: Returns following [facility] where stent was placed in mid/distal LAD following abnormal CCTA and chest pain complaint. He had immediate improvement in chest pressure.   Conclusion:  1.  One-vessel obstructive coronary artery disease involving the mid LAD and distal LAD for which patient underwent successful PCI guided by RFR and OCT   2.  Moderate disease noted in the RCA  3.  Normal left-sided filling pressures     Today- He is coughing significantly, no fever. He thinks it is allergies, it appears to be a cold. His wife also had it. Likely COVID. Will prescribe bromphed to aid in his OTC treatments of mucinex and robutussin.     He still feels fatigued. His chest pain is gone, but he doesn't feel as good and he thought he would. He thinks the 50mg Toprol contributed. He decreased to 25mg and this has helped.   I think with cardiac rehab, weight loss, and exercise he will continue to feel better.      Today, he presents for his 6 month follow up for CAD. He has gained weight and is  aware of this. He is fatigued. He admits to not eating good and sometimes eats as late as 11pm. He is a  and has social obligations that keep him from eating at normal times. He is taking his heart medications as prescribed. His wife helps him with preparing medications. He has tried weight loss- Ozempic but only tried for 1 month, he could not tell if it was working. Still has samples. He denies chest pain, SOA, or sudden onset of weakness or diaphoresis. He reports that he did not have chest pain prior to heart cath. He reports elevated heart rate for hypothyroidism.     The ASCVD Risk score ([place] GEM Jr., et al., 2013) failed to calculate for the following reasons:    The valid total cholesterol range is 130 to 320 mg/dL    Cardiac Risk Factors:  hypercholesterolemia, hypertension, Sedentary life style and Obesity    Past Medical History:   Diagnosis Date   • Thyroid trouble      Past Surgical History:   Procedure Laterality Date   • AMPUTATION     • APPENDECTOMY     • CARDIAC CATHETERIZATION N/A 11/16/2021    Procedure: Left Heart Cath;  Surgeon: Janice Dawson MD;  Location: [facility] INVASIVE LOCATION;  Service: Cardiology;  Laterality: N/A;     Social History     Socioeconomic History   • Marital status:    Tobacco Use   • Smoking status: Former Smoker     Types: Cigarettes     Quit date: 3/1/2007     Years since quitting: 15.2   • Smokeless tobacco: Never Used   Substance and Sexual Activity   • Alcohol use: Never   • Drug use: Never   • Sexual activity: Defer     Family History   Problem Relation Age of Onset   • Kidney disease Mother    • Heart disease Father    • Heart attack Father    • Heart disease Brother        ALLERGIES:  Allergies   Allergen Reactions   • Hydrocodone-Acetaminophen Anxiety     Reaction: anxiety         Review of Systems   Constitutional: Positive for malaise/fatigue. Negative for fever and night sweats.   HENT: Negative for congestion and hoarse voice.    Eyes:  Negative for double vision and photophobia.   Cardiovascular: Negative for dyspnea on exertion, leg swelling, palpitations and paroxysmal nocturnal dyspnea.   Respiratory: Negative for shortness of breath and wheezing.    Endocrine: Negative for polydipsia and polyphagia.   Hematologic/Lymphatic: Negative for adenopathy and bleeding problem.   Gastrointestinal: Negative for nausea and vomiting.   Neurological: Negative for dizziness, light-headedness and weakness.       Current Outpatient Medications   Medication Sig Dispense Refill   • aspirin (aspirin) 81 MG EC tablet Take 1 tablet by mouth Daily. 30 tablet 6   • atorvastatin (LIPITOR) 10 MG tablet Take 1 tablet by mouth Daily. 30 tablet 11   • metoprolol succinate XL (TOPROL-XL) 25 MG 24 hr tablet Take 1 tablet by mouth Daily. 90 tablet 3   • prasugrel (EFFIENT) 10 MG tablet Take 1 tablet by mouth Daily. 90 tablet 3     No current facility-administered medications for this visit.       OBJECTIVE:    Physical Exam:   Vitals reviewed.   Constitutional:       Appearance: Well-groomed and not in distress. Morbidly obese.   Eyes:      General: Lids are normal.      Conjunctiva/sclera: Conjunctivae normal.      Right eye: Right conjunctiva is not injected.      Left eye: Left conjunctiva is not injected.      Pupils: Pupils are equal, round, and reactive to light.   HENT:      Head: Normocephalic and atraumatic.   Pulmonary:      Effort: Pulmonary effort is normal.      Breath sounds: Normal breath sounds. No wheezing. No rhonchi.   Cardiovascular:      PMI at left midclavicular line. Normal rate. Regular rhythm. Normal S1. Normal S2.      Murmurs: There is no murmur.      No gallop.   Edema:     Peripheral edema absent.   Skin:     General: Skin is warm and dry.      Capillary Refill: Capillary refill takes 2 to 3 seconds.      Coloration: Skin is not jaundiced or pale.   Neurological:      Mental Status: Alert, oriented to person, place, and time and oriented to  "person, place and time.      Gait: Gait is intact.   Psychiatric:         Attention and Perception: Attention normal.         Mood and Affect: Mood normal.         Speech: Speech normal.         Behavior: Behavior is cooperative.       Vitals:    06/07/22 1435   BP: 122/84   BP Location: Left arm   Patient Position: Sitting   Cuff Size: Adult   Pulse: 83   SpO2: 98%   Weight: (!) 144 kg (316 lb 12.8 oz)   Height: 175.3 cm (69\")       DATA REVIEWED:   Results for orders placed during the hospital encounter of 11/16/21    Adult Transthoracic Echo Complete w/ Color, Spectral and Contrast if Necessary Per Protocol    Interpretation Summary  · Left ventricular ejection fraction appears to be 56 - 60%. Left ventricular systolic function is normal.  · Left ventricular diastolic function was normal.  · Estimated right ventricular systolic pressure from tricuspid regurgitation is normal (<35 mmHg).      No radiology results for the last 30 days.    Labs: BMP, CBC, LIPID, TSH  Lab Results   Component Value Date    GLUCOSE 120 (H) 11/17/2021    CALCIUM 8.6 11/17/2021     11/17/2021    K 4.6 11/17/2021    CO2 25.0 11/17/2021     11/17/2021    BUN 11 11/17/2021    CREATININE 0.78 11/17/2021    EGFRIFNONA 106 11/17/2021    BCR 14.1 11/17/2021    ANIONGAP 7.0 11/17/2021     Lab Results   Component Value Date    WBC 6.99 11/17/2021    HGB 15.6 11/17/2021    HCT 45.4 11/17/2021    MCV 86.8 11/17/2021     11/17/2021     Lab Results   Component Value Date    CHOL 125 11/16/2021     Lab Results   Component Value Date    TRIG 219 (H) 11/16/2021     Lab Results   Component Value Date    HDL 38 (L) 11/16/2021     No components found for: LDLCALC  Lab Results   Component Value Date    LDL 52 11/16/2021     No results found for: HDLLDLRATIO  No components found for: CHOLHDL  Lab Results   Component Value Date    TSH 0.414 11/16/2021     Lab Results   Component Value Date    PROBNP 10.8 11/01/2021     EKG: " 11/17/2021    TTE: 11/16/2021  Interpretation Summary    · Left ventricular ejection fraction appears to be 56 - 60%. Left ventricular systolic function is normal.  · Left ventricular diastolic function was normal.  · Estimated right ventricular systolic pressure from tricuspid regurgitation is normal (<35 mmHg).     CTA coronary: 11/8/2021  IMPRESSION:  CONCLUSION:   LAD: Atherosclerotic calcification noted in the mid aspect  resulting in greater than 70% stenosis.  RCA: Limited evaluation due to motion artifact in the mid to  distal aspect. Large calcified plaque in the proximal to mid  aspect resulting in 50-70% stenosis. Moderate calcified plaque in  the distal aspect resulting in 50-70% stenosis.  Focus of non calcified plaque with severe (>70%) stenosis vs  motion artifact in the mid to distal RCA   PDA: Moderate to large calcified plaque in the proximal aspect  resulting in at least 50-70% stenosis.     Electronically signed by:  Lei Fair MD  11/9/2021 12:00 PM  CST Workstation: [id number]  LHC: performed by Dr. DIANA Dawson  11/16/2021   Procedure:  1.  Left heart catheterization  2.  Selective coronary angiography  3.  Successful PCI of proximal/mid LAD  4.  Successful PCI of distal LAD  5.  OCT  6.  RFR   Indications:   Angina CCS class III  Abnormal CTA   Site of Entry:  Right radial artery   Catheters used: 6 F EBU 3.5 Guide and 5F [place] 4.0  Course: Informed consent was obtained from the patient after explaining risks/benefits and alternatives. The patient was brought to the cath lab and prepped and draped in the sterile fashion. Timeout was performed. Lidocaine was used for local anesthesia. Access was obtained in the access: Right radial artery using micropuncture and modified Seldinger technique and a  6Fr sheath was placed over the wire. Catheter exchanges were made over a .035 guide under fluoroscopic guidance. 6 F JL 3.5 was used to engage the Left main. Multiple cineographic images were taken  in orthogonal view. Subsequently 5F Tiger 4.0 was used to engage the RCA and multiple cineographic image were taken in orthogonal views. 5F Tiger 4.0 was used to cross the aortic valve. Filling pressures were recorded and pull back was performed. Images reviewed.   Catheter removed over the wire.    Details of RFR and PCI:  6 [place] EBU 3.5 guide catheter was used to engage the left main.  After confirming that ACT was therapeutic the RFR wire was advanced into the coronary artery.  Equalization was performed in the aorta.  Subsequently the wire was advanced across the lesion in the distal LAD.  RFR was recorded and noted to be positive at 0.86 consistent with hemodynamically obstructive coronary artery disease.  RFR pullback was performed which showed a significant stepup at the lesion in the distal LAD however there is also significant stepup in the disease segment of the mid LAD suggestive that both lesions were hemodynamically significant.   After which a run-through wire was advanced across the lesion in the distal LAD.  Predilation was performed with a 2.5 compliant balloon after which a 2.5 x 18 mm Xience/skypoint stent was deployed at nominal pressure and postdilated with a 2.5 noncompliant balloon with excellent angiographic results reducing lesion from 80% to 0% with DOM-3 flow.    Since mid LAD has diffuse disease I elected to perform OCT for optimization of PCI.  OCT was performed to identify the proximal and distal landing zone.  After which a 3.5 x 33 mm Xience/huang point stent was deployed at nominal pressure and postdilated with a 3.5 noncompliant balloon at the distal segment and a 4.0 noncompliant balloon with proximal segment with excellent angiographic results reducing the lesion from 70% to 0%.  Repeat OCT showed mall apposition at the proximal edge after which a 4.5 compliant balloon was used for further post dilation of the proximal edge.  Final run of OCT showed good stent expansion,  apposition and no evidence of proximal or distal edge dissection.  Final angiograms without evidence of guide catheter trauma wire perforation or edge dissection.   Patient tolerated the procedure well.   TR band was applied for hemostasis in the right radial artery.   Findings:   Hemodynamics:  Aortic Pressure: 102/57 map of 74mmHg     LVEDP: 7mmhg    Gradient across aortic valve: None                                      Ventriculography:   Not performed   Selective coronary angiography:  1. Left Main:   Left main is a large caliber vessel which arises from left coronary cusp and gives rise to LAD, ramus intermedius and LCX.  There is no angiographic evidence of obstructive coronary artery disease in the left main.  DOM III flow was noted.   Ramus intermedius is a small caliber less than 2 mm vessel without angiographic evidence of obstructive coronary arteries.     2. Left anterior descending coronary artery:  LAD is a large caliber vessel which gives rise to several septal  and diagonal branches as it runs in anterior interventricular groove and wraps around the apex.  Mid LAD at the takeoff of the first septal  has a long segment of eccentric 60 to 70% severity.  Distal to the mid LAD also has mild diffuse disease of about 40 to 50%.  Proximal section of the distal LAD has a focal concentric stenosis about 70%.  Apical LAD right before the terminal bifurcation has severe disease of about 70%.  This is less than 2 mm without any significant outflow and not amenable to PCI.  DOM III flow was noted     3. Left circumflex coronary artery:  Left circumflex is a medium caliber nondominant vessel which gives rise to medium caliber OM1, followed by a small caliber OM 2 and OM 3.  Mid left circumflex is mild luminal irregularities none of which are more than 30%.  OM1 is medium in caliber but long vessel with moderate tortuosity in the midsegment with mild luminal irregularities none of which are  more than 30%.  OM 2 and 3 are small in caliber.  DOM III flow was noted     4. Right Coronary artery:  RCA is a large-caliber dominant vessel which gives rise to several small caliber RV marginal branches and bifurcates into a medium caliber RPDA and medium caliber RPL.  Proximal RCA has a long segment of moderate eccentric stenosis about 50% extending into the mid RCA.  Distal RCA also has a focal eccentric stenosis about 30-40%.  RPDA is medium in caliber with a mild eccentric stenosis about 50% in the midsegment.  RPL V is medium in caliber giving rise to 3 small caliber RPL B branches without any angiographic evidence of obstructive coronary artery disease.  DOM III flow was noted   Conclusion:  1.  One-vessel obstructive coronary artery disease involving the mid LAD and distal LAD for which patient underwent successful PCI guided by RFR and OCT   2.  Moderate disease noted in the RCA  3.  Normal left-sided filling pressures   Complications:  None  EBL: 5ml   Specimen: None   Recommmendations:   1.  Continue dual antiplatelet with aspirin/Effient for minimum of 12 months  2.  Risk factor modification for secondary prevention  3.  IV fluids per protocol for prevention of REBECA             The following portions of the patient's history were reviewed and updated as appropriate: allergies, current medications, past family history, past medical history, past social history, past surgical history and problem list.  Old records reviewed and pertinent information is included in the above objective data.     ASSESSMENT/PLAN:       Diagnosis Plan   1. Coronary artery disease involving native coronary artery of native heart without angina pectoris             Diagnosis Plan   1. Coronary artery disease involving native coronary artery of native heart without angina pectoris  ASCAD.   Last labs 11/2021. Needs updated labs. He is getting labs done by primary doctor. I asked him to bring copy or have them fax the results to  us.    -Continue Effeint 10 mg daily  -Continue ASA 81 mg daily  -Continue Toprol XL 25 mg daily  -Continue Lipitor 10 mg daily    He has gained weight since his last visit. We discussed weight reduction with portion control and cutting calories. He has done this before and wanting to try again. He also has samples of Ozempic and is going to restart with dietary changes. Encouraged to eat largest meal of the day at breakfast or lunch. Late night eating is not recommend. Encouraged to increase activity as well.    -Start Ozempic 0.25mg SQ weekly, samples were given at last visit. Restart        CCS Functional Classification of Angina   Class Activity Evoking Angina Limits to Physical Activity   I Prolonged exertion None   II Walking > 2 blocks or > 1 flight of stairs Slight   III Walking < 2 blocks or < 1 flight of stairs Marked   IV Minimal or at rest Severe         He was concerned that he did not have chest pain prior to stent placement. I discussed other symptoms that can be chest pain equivalents such as SOA, epigastric pain or heartburn, sudden onset of weakness with nausea, and diaphoresis. Any of these would be concerning.              Return in about 6 months (around 12/7/2022) for Recheck.  I spent 32 minutes caring for Sanket on this date of service. This time includes time spent by me in the following activities: preparing for the visit, reviewing tests, obtaining and/or reviewing a separately obtained history, performing a medically appropriate examination and/or evaluation, counseling and educating the patient/family/caregiver, referring and communicating with other health care professionals, documenting information in the medical record, independently interpreting results and communicating that information with the patient/family/caregiver and care coordination        This document has been electronically signed by CHENG Collins on June 7, 2022 17:35 CDT   Electronically signed by Krystyna PINON  CHENG Reyes, 06/07/22, 3:15 PM CDT.

## 2022-12-09 ENCOUNTER — OFFICE VISIT (OUTPATIENT)
Dept: CARDIOLOGY | Facility: CLINIC | Age: 52
End: 2022-12-09

## 2022-12-09 VITALS
WEIGHT: 309.4 LBS | OXYGEN SATURATION: 97 % | HEART RATE: 64 BPM | HEIGHT: 69 IN | DIASTOLIC BLOOD PRESSURE: 80 MMHG | BODY MASS INDEX: 45.83 KG/M2 | SYSTOLIC BLOOD PRESSURE: 136 MMHG

## 2022-12-09 DIAGNOSIS — I25.10 CORONARY ARTERY DISEASE INVOLVING NATIVE CORONARY ARTERY OF NATIVE HEART WITHOUT ANGINA PECTORIS: Primary | ICD-10-CM

## 2022-12-09 DIAGNOSIS — R06.09 DYSPNEA ON EXERTION: ICD-10-CM

## 2022-12-09 PROCEDURE — 93000 ELECTROCARDIOGRAM COMPLETE: CPT | Performed by: INTERNAL MEDICINE

## 2022-12-09 PROCEDURE — 99214 OFFICE O/P EST MOD 30 MIN: CPT | Performed by: NURSE PRACTITIONER

## 2022-12-09 RX ORDER — METOPROLOL SUCCINATE 25 MG/1
25 TABLET, EXTENDED RELEASE ORAL DAILY
Qty: 90 TABLET | Refills: 3 | Status: SHIPPED | OUTPATIENT
Start: 2022-12-09 | End: 2023-03-15 | Stop reason: SDUPTHER

## 2022-12-09 RX ORDER — ATORVASTATIN CALCIUM 40 MG/1
40 TABLET, FILM COATED ORAL DAILY
Qty: 90 TABLET | Refills: 3 | Status: SHIPPED | OUTPATIENT
Start: 2022-12-09 | End: 2023-03-15 | Stop reason: SDUPTHER

## 2022-12-09 RX ORDER — PRASUGREL 10 MG/1
10 TABLET, FILM COATED ORAL DAILY
Qty: 90 TABLET | Refills: 3 | Status: SHIPPED | OUTPATIENT
Start: 2022-12-09 | End: 2023-03-15 | Stop reason: SDUPTHER

## 2022-12-09 NOTE — PROGRESS NOTES
Coronary artery disease involving native coronary artery of      History of Present Illness  The below note was copied from Neelima ANAND's notes for continuum of care.  11/8/21: Mr. Bishop is a 50 year old patient who presents to [facility] as an ER follow up for chest pain.   He has chest pain chronically for 3 months or more. He has this pain almost daily. IT is reproducible on his right side when he lies down, bends over, or picks something up.   This midsternal pain is sharp and intermittent. This pain is usually moderate and controlled.   His brother had open heart surgery last year at 49 and is not a DM or smoker. His dad was in his 60s with MI and CABG in [place].      He has gained weight. He is not on medication. The BB was ordered a while ago because his HR was high due to hyperthyroidism.      Today I added Lipitor, ASA, and Toprol XL for his angina. CCTA was ordered. Echo from 2014 reviewed, without structural changes.      12/6/21: Returns following [facility] where stent was placed in mid/distal LAD following abnormal CCTA and chest pain complaint. He had immediate improvement in chest pressure.   Conclusion:  1.  One-vessel obstructive coronary artery disease involving the mid LAD and distal LAD for which patient underwent successful PCI guided by RFR and OCT   2.  Moderate disease noted in the RCA  3.  Normal left-sided filling pressures     Today- He is coughing significantly, no fever. He thinks it is allergies, it appears to be a cold. His wife also had it. Likely COVID. Will prescribe bromphed to aid in his OTC treatments of mucinex and robutussin.     He still feels fatigued. His chest pain is gone, but he doesn't feel as good and he thought he would. He thinks the 50mg Toprol contributed. He decreased to 25mg and this has helped.   I think with cardiac rehab, weight loss, and exercise he will continue to feel better.      6/7/22 he presents for his 6 month follow up for CAD. He has gained  weight and is aware of this. He is fatigued. He admits to not eating good and sometimes eats as late as 11pm. He is a  and has social obligations that keep him from eating at normal times. He is taking his heart medications as prescribed. His wife helps him with preparing medications. He has tried weight loss- Ozempic but only tried for 1 month, he could not tell if it was working. Still has samples. He denies chest pain, SOA, or sudden onset of weakness or diaphoresis. He reports that he did not have chest pain prior to heart cath. He reports elevated heart rate for hypothyroidism.     Today, he reports that he has been sick. Recently received a rocephin injection for chest tightness and cough. Still having some SOA and MAJANO. Denies recurrent of chest pain or pressure. Still has some fatigue. Overall doing well.    The ASCVD Risk score (Raghu REYES, et al., 2019) failed to calculate for the following reasons:    The valid total cholesterol range is 130 to 320 mg/dL    Cardiac Risk Factors:  hypercholesterolemia, hypertension, Sedentary life style and Obesity    Past Medical History:   Diagnosis Date   • Thyroid trouble      Past Surgical History:   Procedure Laterality Date   • AMPUTATION     • APPENDECTOMY     • CARDIAC CATHETERIZATION N/A 11/16/2021    Procedure: Left Heart Cath;  Surgeon: Janice Dawson MD;  Location: [facility] INVASIVE LOCATION;  Service: Cardiology;  Laterality: N/A;     Social History     Socioeconomic History   • Marital status:    Tobacco Use   • Smoking status: Former     Types: Cigarettes     Quit date: 3/1/2007     Years since quitting: 15.8   • Smokeless tobacco: Never   Substance and Sexual Activity   • Alcohol use: Never   • Drug use: Never   • Sexual activity: Defer     Family History   Problem Relation Age of Onset   • Kidney disease Mother    • Heart disease Father    • Heart attack Father    • Heart disease Brother        ALLERGIES:  Allergies   Allergen Reactions   •  Hydrocodone-Acetaminophen Anxiety     Reaction: anxiety       Review of Systems   Constitutional: Positive for malaise/fatigue. Negative for fever and night sweats.   HENT: Negative for congestion and hoarse voice.    Eyes: Negative for double vision and photophobia.   Cardiovascular: Negative for dyspnea on exertion, leg swelling, palpitations and paroxysmal nocturnal dyspnea.   Respiratory: Positive for cough and shortness of breath. Negative for wheezing.    Endocrine: Negative for polydipsia, polyphagia and polyuria.   Hematologic/Lymphatic: Negative for adenopathy and bleeding problem.   Musculoskeletal: Negative for falls, muscle cramps, muscle weakness and myalgias.   Gastrointestinal: Negative for nausea and vomiting.   Neurological: Negative for dizziness, light-headedness and weakness.       Current Outpatient Medications   Medication Sig Dispense Refill   • aspirin (aspirin) 81 MG EC tablet Take 1 tablet by mouth Daily. 30 tablet 6   • metoprolol succinate XL (TOPROL-XL) 25 MG 24 hr tablet Take 1 tablet by mouth Daily. 90 tablet 3   • prasugrel (EFFIENT) 10 MG tablet Take 1 tablet by mouth Daily. 90 tablet 3   • atorvastatin (LIPITOR) 40 MG tablet Take 1 tablet by mouth Daily. 90 tablet 3     No current facility-administered medications for this visit.       OBJECTIVE:    Physical Exam:   Vitals reviewed.   Constitutional:       Appearance: Well-groomed and not in distress. Morbidly obese.   Eyes:      General: Lids are normal.      Conjunctiva/sclera: Conjunctivae normal.      Right eye: Right conjunctiva is not injected.      Left eye: Left conjunctiva is not injected.      Pupils: Pupils are equal, round, and reactive to light.   HENT:      Head: Normocephalic and atraumatic.   Pulmonary:      Effort: Pulmonary effort is normal.      Breath sounds: Normal breath sounds. No wheezing. No rhonchi.   Cardiovascular:      PMI at left midclavicular line. Normal rate. Regular rhythm. Normal S1 with normal  "intensity. Normal S2 with normal intensity.      Murmurs: There is no murmur.      No gallop.   Edema:     Peripheral edema absent.   Skin:     General: Skin is warm and dry.      Capillary Refill: Capillary refill takes less than 2 seconds.      Coloration: Skin is not cyanotic, jaundiced, mottled or pale.   Neurological:      Mental Status: Alert, oriented to person, place, and time and oriented to person, place and time.      Gait: Gait is intact.   Psychiatric:         Attention and Perception: Attention normal.         Mood and Affect: Mood normal.         Speech: Speech normal.         Behavior: Behavior normal.         Cognition and Memory: Cognition normal.       Vitals:    12/09/22 1025   BP: 136/80   BP Location: Left arm   Patient Position: Sitting   Cuff Size: Adult   Pulse: 64   SpO2: 97%   Weight: (!) 140 kg (309 lb 6.4 oz)   Height: 175.3 cm (69\")       DATA REVIEWED:   Results for orders placed during the hospital encounter of 11/16/21    Adult Transthoracic Echo Complete w/ Color, Spectral and Contrast if Necessary Per Protocol    Interpretation Summary  · Left ventricular ejection fraction appears to be 56 - 60%. Left ventricular systolic function is normal.  · Left ventricular diastolic function was normal.  · Estimated right ventricular systolic pressure from tricuspid regurgitation is normal (<35 mmHg).      No radiology results for the last 30 days.    Labs: BMP, CBC, LIPID, TSH  Lab Results   Component Value Date    GLUCOSE 120 (H) 11/17/2021    CALCIUM 8.6 11/17/2021     11/17/2021    K 4.6 11/17/2021    CO2 25.0 11/17/2021     11/17/2021    BUN 11 11/17/2021    CREATININE 0.78 11/17/2021    EGFRIFNONA 106 11/17/2021    BCR 14.1 11/17/2021    ANIONGAP 7.0 11/17/2021     Lab Results   Component Value Date    WBC 6.99 11/17/2021    HGB 15.6 11/17/2021    HCT 45.4 11/17/2021    MCV 86.8 11/17/2021     11/17/2021     Lab Results   Component Value Date    CHOL 125 11/16/2021 "     Lab Results   Component Value Date    TRIG 219 (H) 2021     Lab Results   Component Value Date    HDL 38 (L) 2021     No components found for: LDLCALC  Lab Results   Component Value Date    LDL 52 2021     No results found for: HDLLDLRATIO  No components found for: CHOLHDL  Lab Results   Component Value Date    TSH 0.414 2021     Lab Results   Component Value Date    PROBNP 10.8 2021     [id number]    TTE: 2021  Interpretation Summary    · Left ventricular ejection fraction appears to be 56 - 60%. Left ventricular systolic function is normal.  · Left ventricular diastolic function was normal.  · Estimated right ventricular systolic pressure from tricuspid regurgitation is normal (<35 mmHg).     CTA coronary: 2021  IMPRESSION:  CONCLUSION:   LAD: Atherosclerotic calcification noted in the mid aspect  resulting in greater than 70% stenosis.  RCA: Limited evaluation due to motion artifact in the mid to  distal aspect. Large calcified plaque in the proximal to mid  aspect resulting in 50-70% stenosis. Moderate calcified plaque in  the distal aspect resulting in 50-70% stenosis.  Focus of non calcified plaque with severe (>70%) stenosis vs  motion artifact in the mid to distal RCA   PDA: Moderate to large calcified plaque in the proximal aspect  resulting in at least 50-70% stenosis.     Electronically signed by:  Lei Fair MD  2021 12:00 PM  CST Workstation: [id number]    LHC: performed by Dr. DIANA Dawson  2021   Procedure:  1.  Left heart catheterization  2.  Selective coronary angiography  3.  Successful PCI of proximal/mid LAD  4.  Successful PCI of distal LAD  5.  OCT  6.  RFR   Indications:   Angina CCS class III  Abnormal CTA   Site of Entry:  Right radial artery   Catheters used: 6 F EBU 3.5 Guide and 5F Tryon 4.0  Course: Informed consent was obtained from the patient after explaining risks/benefits and alternatives. The patient was brought to the  cath lab and prepped and draped in the sterile fashion. Timeout was performed. Lidocaine was used for local anesthesia. Access was obtained in the access: Right radial artery using micropuncture and modified Seldinger technique and a  6Fr sheath was placed over the wire. Catheter exchanges were made over a .035 guide under fluoroscopic guidance. 6 F JL 3.5 was used to engage the Left main. Multiple cineographic images were taken in orthogonal view. Subsequently 5F Tiger 4.0 was used to engage the RCA and multiple cineographic image were taken in orthogonal views. 5F Tiger 4.0 was used to cross the aortic valve. Filling pressures were recorded and pull back was performed. Images reviewed.   Catheter removed over the wire.    Details of RFR and PCI:  6 Polish EBU 3.5 guide catheter was used to engage the left main.  After confirming that ACT was therapeutic the RFR wire was advanced into the coronary artery.  Equalization was performed in the aorta.  Subsequently the wire was advanced across the lesion in the distal LAD.  RFR was recorded and noted to be positive at 0.86 consistent with hemodynamically obstructive coronary artery disease.  RFR pullback was performed which showed a significant stepup at the lesion in the distal LAD however there is also significant stepup in the disease segment of the mid LAD suggestive that both lesions were hemodynamically significant.   After which a run-through wire was advanced across the lesion in the distal LAD.  Predilation was performed with a 2.5 compliant balloon after which a 2.5 x 18 mm Xience/skypoint stent was deployed at nominal pressure and postdilated with a 2.5 noncompliant balloon with excellent angiographic results reducing lesion from 80% to 0% with DOM-3 flow.    Since mid LAD has diffuse disease I elected to perform OCT for optimization of PCI.  OCT was performed to identify the proximal and distal landing zone.  After which a 3.5 x 33 mm Xience/huang point stent  was deployed at nominal pressure and postdilated with a 3.5 noncompliant balloon at the distal segment and a 4.0 noncompliant balloon with proximal segment with excellent angiographic results reducing the lesion from 70% to 0%.  Repeat OCT showed mall apposition at the proximal edge after which a 4.5 compliant balloon was used for further post dilation of the proximal edge.  Final run of OCT showed good stent expansion, apposition and no evidence of proximal or distal edge dissection.  Final angiograms without evidence of guide catheter trauma wire perforation or edge dissection.   Patient tolerated the procedure well.   TR band was applied for hemostasis in the right radial artery.   Findings:   Hemodynamics:  Aortic Pressure: 102/57 map of 74mmHg     LVEDP: 7mmhg    Gradient across aortic valve: None                                      Ventriculography:   Not performed   Selective coronary angiography:  1. Left Main:   Left main is a large caliber vessel which arises from left coronary cusp and gives rise to LAD, ramus intermedius and LCX.  There is no angiographic evidence of obstructive coronary artery disease in the left main.  DOM III flow was noted.   Ramus intermedius is a small caliber less than 2 mm vessel without angiographic evidence of obstructive coronary arteries.     2. Left anterior descending coronary artery:  LAD is a large caliber vessel which gives rise to several septal  and diagonal branches as it runs in anterior interventricular groove and wraps around the apex.  Mid LAD at the takeoff of the first septal  has a long segment of eccentric 60 to 70% severity.  Distal to the mid LAD also has mild diffuse disease of about 40 to 50%.  Proximal section of the distal LAD has a focal concentric stenosis about 70%.  Apical LAD right before the terminal bifurcation has severe disease of about 70%.  This is less than 2 mm without any significant outflow and not amenable to  PCI.  DOM III flow was noted     3. Left circumflex coronary artery:  Left circumflex is a medium caliber nondominant vessel which gives rise to medium caliber OM1, followed by a small caliber OM 2 and OM 3.  Mid left circumflex is mild luminal irregularities none of which are more than 30%.  OM1 is medium in caliber but long vessel with moderate tortuosity in the midsegment with mild luminal irregularities none of which are more than 30%.  OM 2 and 3 are small in caliber.  DOM III flow was noted     4. Right Coronary artery:  RCA is a large-caliber dominant vessel which gives rise to several small caliber RV marginal branches and bifurcates into a medium caliber RPDA and medium caliber RPL.  Proximal RCA has a long segment of moderate eccentric stenosis about 50% extending into the mid RCA.  Distal RCA also has a focal eccentric stenosis about 30-40%.  RPDA is medium in caliber with a mild eccentric stenosis about 50% in the midsegment.  RPL V is medium in caliber giving rise to 3 small caliber RPL B branches without any angiographic evidence of obstructive coronary artery disease.  DOM III flow was noted   Conclusion:  1.  One-vessel obstructive coronary artery disease involving the mid LAD and distal LAD for which patient underwent successful PCI guided by RFR and OCT   2.  Moderate disease noted in the RCA  3.  Normal left-sided filling pressures   Complications:  None  EBL: 5ml   Specimen: None   Recommmendations:   1.  Continue dual antiplatelet with aspirin/Effient for minimum of 12 months  2.  Risk factor modification for secondary prevention  3.  IV fluids per protocol for prevention of REEBCA           The following portions of the patient's history were reviewed and updated as appropriate: allergies, current medications, past family history, past medical history, past social history, past surgical history and problem list.  Old records reviewed and pertinent information is included in the above objective  data.     ASSESSMENT/PLAN:     Diagnosis Plan   1. Coronary artery disease involving native coronary artery of native heart without angina pectoris  ECG 12 Lead    Adult Transthoracic Echo Complete w/ Color, Spectral and Contrast if Necessary Per Protocol    metoprolol succinate XL (TOPROL-XL) 25 MG 24 hr tablet    prasugrel (EFFIENT) 10 MG tablet    atorvastatin (LIPITOR) 40 MG tablet      2. Dyspnea on exertion  Adult Transthoracic Echo Complete w/ Color, Spectral and Contrast if Necessary Per Protocol            Diagnosis Plan   1. Coronary artery disease involving native coronary artery of native heart without angina pectoris  ASCAD.   I have not received labs from PCP. Asked him to bring a copy at his next visit or drop them off.    -Continue Effeint 10 mg daily, refilled  -Continue ASA 81 mg daily  -Continue Toprol XL 25 mg daily, refilled    -Increased Lipitor to 40 mg daily due to other plaquing in coronary arteries, maximum tolerate dose recommended.     We discussed LDL goal <70.    Discussed weight reductions by cutting calories and increasing activity.           2. Dyspnea on Exertion -Will repeat echocardiogram to reassess LVEF. Last echo 11/21 showing preserved LVEF, Diastolic function normal. No RWMA's     I spent 30 minutes caring for Sanket on this date of service. This time includes time spent by me in the following activities: preparing for the visit, reviewing tests, obtaining and/or reviewing a separately obtained history, performing a medically appropriate examination and/or evaluation, counseling and educating the patient/family/caregiver, referring and communicating with other health care professionals, documenting information in the medical record, independently interpreting results and communicating that information with the patient/family/caregiver and care coordination  Return in about 3 months (around 3/9/2023) for Recheck.      This document has been electronically signed by Krystyna PINON  CHENG Reyes on December 9, 2022 11:05 CST   Electronically signed by CHENG Collins, 12/9/22 11:05 AM CDT.

## 2022-12-13 LAB
QT INTERVAL: 388 MS
QTC INTERVAL: 400 MS

## 2023-03-15 ENCOUNTER — OFFICE VISIT (OUTPATIENT)
Dept: CARDIOLOGY | Facility: CLINIC | Age: 53
End: 2023-03-15
Payer: COMMERCIAL

## 2023-03-15 VITALS
OXYGEN SATURATION: 98 % | HEART RATE: 86 BPM | BODY MASS INDEX: 46.57 KG/M2 | WEIGHT: 314.4 LBS | DIASTOLIC BLOOD PRESSURE: 82 MMHG | SYSTOLIC BLOOD PRESSURE: 126 MMHG | HEIGHT: 69 IN

## 2023-03-15 DIAGNOSIS — I25.10 CORONARY ARTERY DISEASE INVOLVING NATIVE CORONARY ARTERY OF NATIVE HEART WITHOUT ANGINA PECTORIS: Primary | ICD-10-CM

## 2023-03-15 PROCEDURE — 99212 OFFICE O/P EST SF 10 MIN: CPT | Performed by: NURSE PRACTITIONER

## 2023-03-15 RX ORDER — PRASUGREL 10 MG/1
10 TABLET, FILM COATED ORAL DAILY
Qty: 90 TABLET | Refills: 3 | Status: SHIPPED | OUTPATIENT
Start: 2023-03-15

## 2023-03-15 RX ORDER — ASPIRIN 81 MG/1
81 TABLET ORAL DAILY
Qty: 30 TABLET | Refills: 6 | Status: SHIPPED | OUTPATIENT
Start: 2023-03-15

## 2023-03-15 RX ORDER — METOPROLOL SUCCINATE 25 MG/1
25 TABLET, EXTENDED RELEASE ORAL DAILY
Qty: 90 TABLET | Refills: 3 | Status: SHIPPED | OUTPATIENT
Start: 2023-03-15

## 2023-03-15 RX ORDER — ATORVASTATIN CALCIUM 40 MG/1
40 TABLET, FILM COATED ORAL DAILY
Qty: 90 TABLET | Refills: 3 | Status: SHIPPED | OUTPATIENT
Start: 2023-03-15

## 2023-03-15 NOTE — PROGRESS NOTES
Coronary artery disease involving native coronary artery of    History of Present Illness  The below note was copied from Neelima ANAND's notes for continuum of care.  11/8/21: Mr. Bishop is a 50 year old patient who presents to [facility] as an ER follow up for chest pain.   He has chest pain chronically for 3 months or more. He has this pain almost daily. IT is reproducible on his right side when he lies down, bends over, or picks something up.   This midsternal pain is sharp and intermittent. This pain is usually moderate and controlled.   His brother had open heart surgery last year at 49 and is not a DM or smoker. His dad was in his 60s with MI and CABG in [place].      He has gained weight. He is not on medication. The BB was ordered a while ago because his HR was high due to hyperthyroidism.      Today I added Lipitor, ASA, and Toprol XL for his angina. CCTA was ordered. Echo from 2014 reviewed, without structural changes.      12/6/21: Returns following [facility] where stent was placed in mid/distal LAD following abnormal CCTA and chest pain complaint. He had immediate improvement in chest pressure.   Conclusion:  1.  One-vessel obstructive coronary artery disease involving the mid LAD and distal LAD for which patient underwent successful PCI guided by RFR and OCT   2.  Moderate disease noted in the RCA  3.  Normal left-sided filling pressures     Today- He is coughing significantly, no fever. He thinks it is allergies, it appears to be a cold. His wife also had it. Likely COVID. Will prescribe bromphed to aid in his OTC treatments of mucinex and robutussin.     He still feels fatigued. His chest pain is gone, but he doesn't feel as good and he thought he would. He thinks the 50mg Toprol contributed. He decreased to 25mg and this has helped.   I think with cardiac rehab, weight loss, and exercise he will continue to feel better.      6/7/22 he presents for his 6 month follow up for CAD. He has gained  weight and is aware of this. He is fatigued. He admits to not eating good and sometimes eats as late as 11pm. He is a  and has social obligations that keep him from eating at normal times. He is taking his heart medications as prescribed. His wife helps him with preparing medications. He has tried weight loss- Ozempic but only tried for 1 month, he could not tell if it was working. Still has samples. He denies chest pain, SOA, or sudden onset of weakness or diaphoresis. He reports that he did not have chest pain prior to heart cath. He reports elevated heart rate for hypothyroidism.     12/9/22, he reports that he has been sick. Recently received a rocephin injection for chest tightness and cough. Still having some SOA and MAJANO. Denies recurrent of chest pain or pressure. Still has some fatigue. Overall doing well.    Today, he presents for follow-up regarding CAD.  He reports that he is doing well.  Has right arm pain that is reproducible upon moving neck and bending forward.  Atypical for cardiac etiology.  He denies chest pain, shortness of breath, or dyspnea on exertion.  We discussed recent echocardiogram showing normal LV systolic function, there is some impaired relaxation of left ventricle.  His blood pressure is normotensive today.  He is overweight.  Has a history of normal hemoglobin A1c.  We discussed decreasing calories and increasing activity daily.  He tells me that his turkey season coming up and he is more active in the spring.  We discussed at least a 20 pound weight loss.    The ASCVD Risk score (Raghu REYES, et al., 2019) failed to calculate for the following reasons:    The valid total cholesterol range is 130 to 320 mg/dL    Cardiac Risk Factors:  hypercholesterolemia, hypertension, Sedentary life style and Obesity    Past Medical History:   Diagnosis Date   • Thyroid trouble      Past Surgical History:   Procedure Laterality Date   • AMPUTATION     • APPENDECTOMY     • CARDIAC  CATHETERIZATION N/A 2021    Procedure: Left Heart Cath;  Surgeon: Janice Dawson MD;  Location: [facility] CATH INVASIVE LOCATION;  Service: Cardiology;  Laterality: N/A;     Social History     Socioeconomic History   • Marital status:    Tobacco Use   • Smoking status: Former     Types: Cigarettes     Quit date: 3/1/2007     Years since quittin.0   • Smokeless tobacco: Never   Substance and Sexual Activity   • Alcohol use: Never   • Drug use: Never   • Sexual activity: Defer     Family History   Problem Relation Age of Onset   • Kidney disease Mother    • Heart disease Father    • Heart attack Father    • Heart disease Brother        ALLERGIES:  Allergies   Allergen Reactions   • Hydrocodone-Acetaminophen Anxiety     Reaction: anxiety       Review of Systems   Constitutional: Negative for diaphoresis, malaise/fatigue and night sweats.   Cardiovascular: Negative for chest pain and dyspnea on exertion.   Respiratory: Negative for cough and shortness of breath.    Endocrine: Negative.    Musculoskeletal: Negative for muscle cramps, muscle weakness and myalgias.   Gastrointestinal: Negative for abdominal pain, heartburn and nausea.   Neurological: Negative for dizziness, light-headedness and weakness.       Current Outpatient Medications   Medication Sig Dispense Refill   • aspirin 81 MG EC tablet Take 1 tablet by mouth Daily. 30 tablet 6   • atorvastatin (LIPITOR) 40 MG tablet Take 1 tablet by mouth Daily. 90 tablet 3   • metoprolol succinate XL (TOPROL-XL) 25 MG 24 hr tablet Take 1 tablet by mouth Daily. 90 tablet 3   • prasugrel (EFFIENT) 10 MG tablet Take 1 tablet by mouth Daily. 90 tablet 3     No current facility-administered medications for this visit.       OBJECTIVE:    Physical Exam:   Vitals reviewed.   Constitutional:       Appearance: Well-developed, well-groomed and not in distress. Morbidly obese. Not ill-appearing or diaphoretic.   Eyes:      General:         Right eye: No discharge.        "  Left eye: No discharge.      Conjunctiva/sclera: Conjunctivae normal.      Right eye: Right conjunctiva is not injected.      Left eye: Left conjunctiva is not injected.      Pupils: Pupils are equal, round, and reactive to light.   HENT:      Head: Normocephalic and atraumatic.   Pulmonary:      Effort: Pulmonary effort is normal.      Breath sounds: Normal breath sounds and air entry.   Cardiovascular:      Normal rate. Regular rhythm. Normal S1 with normal intensity. Normal S2 with normal intensity.      Murmurs: There is no murmur.      No gallop.   Edema:     Peripheral edema absent.   Skin:     General: Skin is warm and dry.      Capillary Refill: Capillary refill takes less than 2 seconds.      Coloration: Skin is not pale.   Neurological:      Mental Status: Alert, oriented to person, place, and time and oriented to person, place and time.      GCS: GCS eye subscore is 4. GCS verbal subscore is 5. GCS motor subscore is 6.      Gait: Gait is intact.   Psychiatric:         Attention and Perception: Attention normal.         Mood and Affect: Mood normal.         Speech: Speech normal.       Vitals:    03/15/23 1018   BP: 126/82   BP Location: Left arm   Patient Position: Sitting   Cuff Size: Adult   Pulse: 86   SpO2: 98%   Weight: (!) 143 kg (314 lb 6.4 oz)   Height: 175.3 cm (69.02\")       DATA REVIEWED:   Results for orders placed in visit on 02/28/23    Adult Transthoracic Echo Complete w/ Color, Spectral and Contrast if Necessary Per Protocol    Interpretation Summary  •  Left ventricular systolic function is normal. Left ventricular ejection fraction appears to be 56 - 60%.  •  Left ventricular diastolic function is consistent with (grade I) impaired relaxation.  •  Estimated right ventricular systolic pressure from tricuspid regurgitation is normal (<35 mmHg).      No radiology results for the last 30 days.    Labs: BMP, CBC, LIPID, TSH  Lab Results   Component Value Date    GLUCOSE 120 (H) 11/17/2021 "    CALCIUM 8.6 2021     2021    K 4.6 2021    CO2 25.0 2021     2021    BUN 11 2021    CREATININE 0.78 2021    EGFRIFNONA 106 2021    BCR 14.1 2021    ANIONGAP 7.0 2021     Lab Results   Component Value Date    WBC 6.99 2021    HGB 15.6 2021    HCT 45.4 2021    MCV 86.8 2021     2021     Lab Results   Component Value Date    CHOL 125 2021     Lab Results   Component Value Date    TRIG 219 (H) 2021     Lab Results   Component Value Date    HDL 38 (L) 2021     No components found for: LDLCALC  Lab Results   Component Value Date    LDL 52 2021     No results found for: HDLLDLRATIO  No components found for: CHOLHDL  Lab Results   Component Value Date    TSH 0.414 2021     Lab Results   Component Value Date    PROBNP 10.8 2021     EK2021    TTE: 2021  Interpretation Summary    · Left ventricular ejection fraction appears to be 56 - 60%. Left ventricular systolic function is normal.  · Left ventricular diastolic function was normal.  · Estimated right ventricular systolic pressure from tricuspid regurgitation is normal (<35 mmHg).     CTA coronary: 2021  IMPRESSION:  CONCLUSION:   LAD: Atherosclerotic calcification noted in the mid aspect  resulting in greater than 70% stenosis.  RCA: Limited evaluation due to motion artifact in the mid to  distal aspect. Large calcified plaque in the proximal to mid  aspect resulting in 50-70% stenosis. Moderate calcified plaque in  the distal aspect resulting in 50-70% stenosis.  Focus of non calcified plaque with severe (>70%) stenosis vs  motion artifact in the mid to distal RCA   PDA: Moderate to large calcified plaque in the proximal aspect  resulting in at least 50-70% stenosis.     Electronically signed by:  Lei Fair MD  2021 12:00 PM  CST Workstation: [id number]    LHC: performed by Dr. ORTIZ  Hamilton  11/16/2021   Procedure:  1.  Left heart catheterization  2.  Selective coronary angiography  3.  Successful PCI of proximal/mid LAD  4.  Successful PCI of distal LAD  5.  OCT  6.  RFR   Indications:   Angina CCS class III  Abnormal CTA   Site of Entry:  Right radial artery   Catheters used: 6 F EBU 3.5 Guide and 5F [place] 4.0  Course: Informed consent was obtained from the patient after explaining risks/benefits and alternatives. The patient was brought to the cath lab and prepped and draped in the sterile fashion. Timeout was performed. Lidocaine was used for local anesthesia. Access was obtained in the access: Right radial artery using micropuncture and modified Seldinger technique and a  6Fr sheath was placed over the wire. Catheter exchanges were made over a .035 guide under fluoroscopic guidance. 6 F JL 3.5 was used to engage the Left main. Multiple cineographic images were taken in orthogonal view. Subsequently 5F Tiger 4.0 was used to engage the RCA and multiple cineographic image were taken in orthogonal views. 5F Tiger 4.0 was used to cross the aortic valve. Filling pressures were recorded and pull back was performed. Images reviewed.   Catheter removed over the wire.    Details of RFR and PCI:  6 [place] EBU 3.5 guide catheter was used to engage the left main.  After confirming that ACT was therapeutic the RFR wire was advanced into the coronary artery.  Equalization was performed in the aorta.  Subsequently the wire was advanced across the lesion in the distal LAD.  RFR was recorded and noted to be positive at 0.86 consistent with hemodynamically obstructive coronary artery disease.  RFR pullback was performed which showed a significant stepup at the lesion in the distal LAD however there is also significant stepup in the disease segment of the mid LAD suggestive that both lesions were hemodynamically significant.   After which a run-through wire was advanced across the lesion in the distal LAD.   Predilation was performed with a 2.5 compliant balloon after which a 2.5 x 18 mm Xience/skypoint stent was deployed at nominal pressure and postdilated with a 2.5 noncompliant balloon with excellent angiographic results reducing lesion from 80% to 0% with DOM-3 flow.    Since mid LAD has diffuse disease I elected to perform OCT for optimization of PCI.  OCT was performed to identify the proximal and distal landing zone.  After which a 3.5 x 33 mm Xience/huang point stent was deployed at nominal pressure and postdilated with a 3.5 noncompliant balloon at the distal segment and a 4.0 noncompliant balloon with proximal segment with excellent angiographic results reducing the lesion from 70% to 0%.  Repeat OCT showed mall apposition at the proximal edge after which a 4.5 compliant balloon was used for further post dilation of the proximal edge.  Final run of OCT showed good stent expansion, apposition and no evidence of proximal or distal edge dissection.  Final angiograms without evidence of guide catheter trauma wire perforation or edge dissection.   Patient tolerated the procedure well.   TR band was applied for hemostasis in the right radial artery.   Findings:   Hemodynamics:  Aortic Pressure: 102/57 map of 74mmHg     LVEDP: 7mmhg    Gradient across aortic valve: None                                      Ventriculography:   Not performed   Selective coronary angiography:  1. Left Main:   Left main is a large caliber vessel which arises from left coronary cusp and gives rise to LAD, ramus intermedius and LCX.  There is no angiographic evidence of obstructive coronary artery disease in the left main.  DOM III flow was noted.   Ramus intermedius is a small caliber less than 2 mm vessel without angiographic evidence of obstructive coronary arteries.     2. Left anterior descending coronary artery:  LAD is a large caliber vessel which gives rise to several septal  and diagonal branches as it runs in anterior  interventricular groove and wraps around the apex.  Mid LAD at the takeoff of the first septal  has a long segment of eccentric 60 to 70% severity.  Distal to the mid LAD also has mild diffuse disease of about 40 to 50%.  Proximal section of the distal LAD has a focal concentric stenosis about 70%.  Apical LAD right before the terminal bifurcation has severe disease of about 70%.  This is less than 2 mm without any significant outflow and not amenable to PCI.  DOM III flow was noted     3. Left circumflex coronary artery:  Left circumflex is a medium caliber nondominant vessel which gives rise to medium caliber OM1, followed by a small caliber OM 2 and OM 3.  Mid left circumflex is mild luminal irregularities none of which are more than 30%.  OM1 is medium in caliber but long vessel with moderate tortuosity in the midsegment with mild luminal irregularities none of which are more than 30%.  OM 2 and 3 are small in caliber.  DOM III flow was noted     4. Right Coronary artery:  RCA is a large-caliber dominant vessel which gives rise to several small caliber RV marginal branches and bifurcates into a medium caliber RPDA and medium caliber RPL.  Proximal RCA has a long segment of moderate eccentric stenosis about 50% extending into the mid RCA.  Distal RCA also has a focal eccentric stenosis about 30-40%.  RPDA is medium in caliber with a mild eccentric stenosis about 50% in the midsegment.  RPL V is medium in caliber giving rise to 3 small caliber RPL B branches without any angiographic evidence of obstructive coronary artery disease.  DOM III flow was noted   Conclusion:  1.  One-vessel obstructive coronary artery disease involving the mid LAD and distal LAD for which patient underwent successful PCI guided by RFR and OCT   2.  Moderate disease noted in the RCA  3.  Normal left-sided filling pressures   Complications:  None  EBL: 5ml   Specimen: None   Recommmendations:   1.  Continue dual antiplatelet  with aspirin/Effient for minimum of 12 months  2.  Risk factor modification for secondary prevention  3.  IV fluids per protocol for prevention of REBECA         The following portions of the patient's history were reviewed and updated as appropriate: allergies, current medications, past family history, past medical history, past social history, past surgical history and problem list.  Old records reviewed and pertinent information is included in the above objective data.     ASSESSMENT/PLAN:     Diagnosis Plan   1. Coronary artery disease involving native coronary artery of native heart without angina pectoris  aspirin 81 MG EC tablet    atorvastatin (LIPITOR) 40 MG tablet    metoprolol succinate XL (TOPROL-XL) 25 MG 24 hr tablet    prasugrel (EFFIENT) 10 MG tablet            Diagnosis Plan   1. Coronary artery disease involving native coronary artery of native heart without angina pectoris  ASCAD.   Mid and distal LAD stenting.  Has 50% proximal RCA at that time.  I  Doing well.  LVEF preserved.  Does have grade 1 diastolic impaired relaxation.  Blood pressure normotensive.    -Continue Effeint 10 mg daily, refilled  -Continue ASA 81 mg daily, refilled  -Continue Toprol XL 25 mg daily, refilled    Last LDL at go, 53     Discussed weight reduction by cutting calories and increasing activity.  He is more active in the spring.  I discussed a 20 pound weight loss with him.  Low impact exercises such as stationary bike and swimming is best.           I spent 18 minutes caring for Sanket on this date of service. This time includes time spent by me in the following activities: reviewing tests, obtaining and/or reviewing a separately obtained history, performing a medically appropriate examination and/or evaluation, counseling and educating the patient/family/caregiver, ordering medications, tests, or procedures and documenting information in the medical record  Return in about 1 year (around 3/15/2024) for  Recheck.        This document has been electronically signed by CHENG Collins on March 15, 2023 10:54 CDT   Electronically signed by CHENG Collins, 03/15/23, 10:54 AM CDT.

## (undated) DEVICE — CATH GUIDE LAUNCHER EBU3.5 6F 100CM

## (undated) DEVICE — X-DRAPE ABS 12"X17" .25MM LEAD EQUIV STERILE X-RAY SHIELD 10/BOX: Brand: X-DRAPE

## (undated) DEVICE — PK CATH LAB 60

## (undated) DEVICE — MODEL AT P65, P/N 701554-001KIT CONTENTS: HAND CONTROLLER, 3-WAY HIGH-PRESSURE STOPCOCK WITH ROTATING END AND PREMIUM HIGH-PRESSURE TUBING: Brand: ANGIOTOUCH® KIT

## (undated) DEVICE — GLIDESHEATH SLENDER STAINLESS STEEL KIT: Brand: GLIDESHEATH SLENDER

## (undated) DEVICE — RUNTHROUGH NS EXTRA FLOPPY PTCA GUIDEWIRE: Brand: RUNTHROUGH

## (undated) DEVICE — BALN PTCA TAKERU RX NC 2.5X12MM

## (undated) DEVICE — TR BAND RADIAL ARTERY COMPRESSION DEVICE: Brand: TR BAND

## (undated) DEVICE — CATH DIAG EXPO .056 FL3.5 6F 100CM

## (undated) DEVICE — COPILOT KIT INCLUDES BLEEDBACK CONTROL VALVE / GUIDE WIRE INTRODUCER / TORQUE DEVICE: Brand: ACCESSORIES

## (undated) DEVICE — BALN PTCA TAKERU RX 2.5X12MM

## (undated) DEVICE — TREK CORONARY DILATATION CATHETER 4.50 MM X 12 MM / RAPID-EXCHANGE: Brand: TREK

## (undated) DEVICE — BALN PTCA TAKERU RX NC 4X12MM

## (undated) DEVICE — BAND BAG 36" X 36": Brand: STERIMED

## (undated) DEVICE — GW PRESSUREWIRE X WIRELESS FFR 175CM

## (undated) DEVICE — MODEL BT2000 P/N 700287-012KIT CONTENTS: MANIFOLD WITH SALINE AND CONTRAST PORTS, SALINE TUBING WITH SPIKE AND HAND SYRINGE, TRANSDUCER: Brand: BT2000 AUTOMATED MANIFOLD KIT

## (undated) DEVICE — CATH IMG DRAGONFLY OPTIS 2.7F 135CM

## (undated) DEVICE — GW PERIPH GUIDERIGHT STD/EXCHNG/J/TIP SS 0.035IN 5X260CM

## (undated) DEVICE — BALN PTCA TAKERU RX NC 3.5X21MM

## (undated) DEVICE — ELECTRODE,RT,STRESS,FOAM,50PK: Brand: MEDLINE

## (undated) DEVICE — A2000 MULTI-USE SYRINGE KIT, P/N 701277-003KIT CONTENTS: 100ML CONTRAST RESERVOIR AND TUBING WITH CONTRAST SPIKE AND CLAMP: Brand: A2000 MULTI-USE SYRINGE KIT

## (undated) DEVICE — RADIFOCUS OPTITORQUE ANGIOGRAPHIC CATHETER: Brand: OPTITORQUE